# Patient Record
Sex: MALE | Race: WHITE | Employment: FULL TIME | URBAN - METROPOLITAN AREA
[De-identification: names, ages, dates, MRNs, and addresses within clinical notes are randomized per-mention and may not be internally consistent; named-entity substitution may affect disease eponyms.]

---

## 2019-02-02 ENCOUNTER — APPOINTMENT (OUTPATIENT)
Dept: GENERAL RADIOLOGY | Age: 54
DRG: 247 | End: 2019-02-02
Payer: COMMERCIAL

## 2019-02-02 ENCOUNTER — HOSPITAL ENCOUNTER (INPATIENT)
Age: 54
LOS: 2 days | Discharge: HOME | DRG: 247 | End: 2019-02-04
Attending: EMERGENCY MEDICINE | Admitting: EMERGENCY MEDICINE
Payer: COMMERCIAL

## 2019-02-02 DIAGNOSIS — I49.8 BIGEMINY: ICD-10-CM

## 2019-02-02 DIAGNOSIS — R07.9 CHEST PAIN, UNSPECIFIED TYPE: Primary | ICD-10-CM

## 2019-02-02 DIAGNOSIS — R77.8 ELEVATED TROPONIN: ICD-10-CM

## 2019-02-02 PROBLEM — I21.4 NSTEMI (NON-ST ELEVATED MYOCARDIAL INFARCTION) (HCC): Status: ACTIVE | Noted: 2019-02-02

## 2019-02-02 LAB
ALBUMIN SERPL-MCNC: 4.3 GM/DL (ref 3.4–5)
ALP BLD-CCNC: 75 IU/L (ref 40–129)
ALT SERPL-CCNC: 15 U/L (ref 10–40)
ANION GAP SERPL CALCULATED.3IONS-SCNC: 12 MMOL/L (ref 4–16)
AST SERPL-CCNC: 16 IU/L (ref 15–37)
BASOPHILS ABSOLUTE: 0.1 K/CU MM
BASOPHILS RELATIVE PERCENT: 0.6 % (ref 0–1)
BILIRUB SERPL-MCNC: 0.2 MG/DL (ref 0–1)
BUN BLDV-MCNC: 16 MG/DL (ref 6–23)
CALCIUM SERPL-MCNC: 9.5 MG/DL (ref 8.3–10.6)
CHLORIDE BLD-SCNC: 103 MMOL/L (ref 99–110)
CO2: 27 MMOL/L (ref 21–32)
CREAT SERPL-MCNC: 1.1 MG/DL (ref 0.9–1.3)
DIFFERENTIAL TYPE: ABNORMAL
EOSINOPHILS ABSOLUTE: 0.5 K/CU MM
EOSINOPHILS RELATIVE PERCENT: 5 % (ref 0–3)
GFR AFRICAN AMERICAN: >60 ML/MIN/1.73M2
GFR NON-AFRICAN AMERICAN: >60 ML/MIN/1.73M2
GLUCOSE BLD-MCNC: 137 MG/DL (ref 70–99)
HCT VFR BLD CALC: 43.1 % (ref 42–52)
HEMOGLOBIN: 14.2 GM/DL (ref 13.5–18)
IMMATURE NEUTROPHIL %: 0.2 % (ref 0–0.43)
LYMPHOCYTES ABSOLUTE: 4.2 K/CU MM
LYMPHOCYTES RELATIVE PERCENT: 41.3 % (ref 24–44)
MCH RBC QN AUTO: 30.3 PG (ref 27–31)
MCHC RBC AUTO-ENTMCNC: 32.9 % (ref 32–36)
MCV RBC AUTO: 91.9 FL (ref 78–100)
MONOCYTES ABSOLUTE: 0.7 K/CU MM
MONOCYTES RELATIVE PERCENT: 7 % (ref 0–4)
NUCLEATED RBC %: 0 %
PDW BLD-RTO: 13.3 % (ref 11.7–14.9)
PLATELET # BLD: 304 K/CU MM (ref 140–440)
PMV BLD AUTO: 9.3 FL (ref 7.5–11.1)
POTASSIUM SERPL-SCNC: 4.6 MMOL/L (ref 3.5–5.1)
PRO-BNP: 230.1 PG/ML
RBC # BLD: 4.69 M/CU MM (ref 4.6–6.2)
SEGMENTED NEUTROPHILS ABSOLUTE COUNT: 4.7 K/CU MM
SEGMENTED NEUTROPHILS RELATIVE PERCENT: 45.9 % (ref 36–66)
SODIUM BLD-SCNC: 142 MMOL/L (ref 135–145)
TOTAL IMMATURE NEUTOROPHIL: 0.02 K/CU MM
TOTAL NUCLEATED RBC: 0 K/CU MM
TOTAL PROTEIN: 7.3 GM/DL (ref 6.4–8.2)
TROPONIN T: 0.04 NG/ML
TROPONIN T: 0.06 NG/ML
WBC # BLD: 10.2 K/CU MM (ref 4–10.5)

## 2019-02-02 PROCEDURE — 36415 COLL VENOUS BLD VENIPUNCTURE: CPT

## 2019-02-02 PROCEDURE — G0378 HOSPITAL OBSERVATION PER HR: HCPCS

## 2019-02-02 PROCEDURE — 93010 ELECTROCARDIOGRAM REPORT: CPT | Performed by: INTERNAL MEDICINE

## 2019-02-02 PROCEDURE — 96372 THER/PROPH/DIAG INJ SC/IM: CPT

## 2019-02-02 PROCEDURE — 99285 EMERGENCY DEPT VISIT HI MDM: CPT

## 2019-02-02 PROCEDURE — 84484 ASSAY OF TROPONIN QUANT: CPT

## 2019-02-02 PROCEDURE — 6370000000 HC RX 637 (ALT 250 FOR IP): Performed by: EMERGENCY MEDICINE

## 2019-02-02 PROCEDURE — 83880 ASSAY OF NATRIURETIC PEPTIDE: CPT

## 2019-02-02 PROCEDURE — 93005 ELECTROCARDIOGRAM TRACING: CPT | Performed by: EMERGENCY MEDICINE

## 2019-02-02 PROCEDURE — 1200000000 HC SEMI PRIVATE

## 2019-02-02 PROCEDURE — 85025 COMPLETE CBC W/AUTO DIFF WBC: CPT

## 2019-02-02 PROCEDURE — 6360000002 HC RX W HCPCS: Performed by: EMERGENCY MEDICINE

## 2019-02-02 PROCEDURE — 6370000000 HC RX 637 (ALT 250 FOR IP): Performed by: INTERNAL MEDICINE

## 2019-02-02 PROCEDURE — 80053 COMPREHEN METABOLIC PANEL: CPT

## 2019-02-02 PROCEDURE — 2580000003 HC RX 258: Performed by: INTERNAL MEDICINE

## 2019-02-02 PROCEDURE — 71045 X-RAY EXAM CHEST 1 VIEW: CPT

## 2019-02-02 RX ORDER — ASPIRIN 81 MG/1
324 TABLET, CHEWABLE ORAL ONCE
Status: COMPLETED | OUTPATIENT
Start: 2019-02-02 | End: 2019-02-02

## 2019-02-02 RX ORDER — LORATADINE 10 MG/1
5 TABLET ORAL EVERY MORNING
COMMUNITY

## 2019-02-02 RX ORDER — ASPIRIN 81 MG/1
81 TABLET ORAL DAILY
Status: DISCONTINUED | OUTPATIENT
Start: 2019-02-03 | End: 2019-02-03 | Stop reason: ALTCHOICE

## 2019-02-02 RX ORDER — CETIRIZINE HYDROCHLORIDE 5 MG/1
5 TABLET ORAL DAILY
Status: DISCONTINUED | OUTPATIENT
Start: 2019-02-02 | End: 2019-02-04 | Stop reason: HOSPADM

## 2019-02-02 RX ORDER — NITROGLYCERIN 0.4 MG/1
0.4 TABLET SUBLINGUAL EVERY 5 MIN PRN
Status: DISCONTINUED | OUTPATIENT
Start: 2019-02-02 | End: 2019-02-04 | Stop reason: HOSPADM

## 2019-02-02 RX ORDER — SODIUM CHLORIDE 9 MG/ML
INJECTION, SOLUTION INTRAVENOUS CONTINUOUS
Status: DISCONTINUED | OUTPATIENT
Start: 2019-02-02 | End: 2019-02-03 | Stop reason: ALTCHOICE

## 2019-02-02 RX ADMIN — CETIRIZINE HYDROCHLORIDE 5 MG: 5 TABLET, FILM COATED ORAL at 22:00

## 2019-02-02 RX ADMIN — NITROGLYCERIN 0.4 MG: 0.4 TABLET, ORALLY DISINTEGRATING SUBLINGUAL at 16:15

## 2019-02-02 RX ADMIN — ENOXAPARIN SODIUM 105 MG: 120 INJECTION SUBCUTANEOUS at 16:58

## 2019-02-02 RX ADMIN — SODIUM CHLORIDE: 9 INJECTION, SOLUTION INTRAVENOUS at 21:59

## 2019-02-02 RX ADMIN — ASPIRIN 81 MG 324 MG: 81 TABLET ORAL at 16:14

## 2019-02-02 ASSESSMENT — HEART SCORE: ECG: 0

## 2019-02-02 ASSESSMENT — PAIN SCALES - GENERAL: PAINLEVEL_OUTOF10: 0

## 2019-02-03 LAB
ACTIVATED CLOTTING TIME, LOW RANGE: 173 SEC
ACTIVATED CLOTTING TIME, LOW RANGE: >400 SEC
ANION GAP SERPL CALCULATED.3IONS-SCNC: 11 MMOL/L (ref 4–16)
BASOPHILS ABSOLUTE: 0.1 K/CU MM
BASOPHILS RELATIVE PERCENT: 0.6 % (ref 0–1)
BUN BLDV-MCNC: 17 MG/DL (ref 6–23)
CALCIUM SERPL-MCNC: 8.9 MG/DL (ref 8.3–10.6)
CHLORIDE BLD-SCNC: 103 MMOL/L (ref 99–110)
CHOLESTEROL: 247 MG/DL
CO2: 27 MMOL/L (ref 21–32)
CREAT SERPL-MCNC: 1.3 MG/DL (ref 0.9–1.3)
DIFFERENTIAL TYPE: ABNORMAL
EKG ATRIAL RATE: 77 BPM
EKG ATRIAL RATE: 87 BPM
EKG ATRIAL RATE: 88 BPM
EKG DIAGNOSIS: NORMAL
EKG P AXIS: 19 DEGREES
EKG P AXIS: 40 DEGREES
EKG P AXIS: 78 DEGREES
EKG P-R INTERVAL: 148 MS
EKG P-R INTERVAL: 156 MS
EKG P-R INTERVAL: 166 MS
EKG Q-T INTERVAL: 394 MS
EKG Q-T INTERVAL: 396 MS
EKG Q-T INTERVAL: 408 MS
EKG QRS DURATION: 100 MS
EKG QRS DURATION: 86 MS
EKG QRS DURATION: 92 MS
EKG QTC CALCULATION (BAZETT): 445 MS
EKG QTC CALCULATION (BAZETT): 476 MS
EKG QTC CALCULATION (BAZETT): 493 MS
EKG R AXIS: -13 DEGREES
EKG R AXIS: 200 DEGREES
EKG R AXIS: 4 DEGREES
EKG T AXIS: 117 DEGREES
EKG T AXIS: 17 DEGREES
EKG T AXIS: 75 DEGREES
EKG VENTRICULAR RATE: 77 BPM
EKG VENTRICULAR RATE: 87 BPM
EKG VENTRICULAR RATE: 88 BPM
EOSINOPHILS ABSOLUTE: 0.6 K/CU MM
EOSINOPHILS RELATIVE PERCENT: 5.5 % (ref 0–3)
GFR AFRICAN AMERICAN: >60 ML/MIN/1.73M2
GFR NON-AFRICAN AMERICAN: 58 ML/MIN/1.73M2
GLUCOSE BLD-MCNC: 103 MG/DL (ref 70–99)
HCT VFR BLD CALC: 41.6 % (ref 42–52)
HDLC SERPL-MCNC: 34 MG/DL
HEMOGLOBIN: 13.6 GM/DL (ref 13.5–18)
IMMATURE NEUTROPHIL %: 0.2 % (ref 0–0.43)
LDL CHOLESTEROL DIRECT: 171 MG/DL
LYMPHOCYTES ABSOLUTE: 4.8 K/CU MM
LYMPHOCYTES RELATIVE PERCENT: 42.7 % (ref 24–44)
MAGNESIUM: 2.1 MG/DL (ref 1.8–2.4)
MCH RBC QN AUTO: 30.2 PG (ref 27–31)
MCHC RBC AUTO-ENTMCNC: 32.7 % (ref 32–36)
MCV RBC AUTO: 92.4 FL (ref 78–100)
MONOCYTES ABSOLUTE: 0.8 K/CU MM
MONOCYTES RELATIVE PERCENT: 6.8 % (ref 0–4)
NUCLEATED RBC %: 0 %
PDW BLD-RTO: 13.4 % (ref 11.7–14.9)
PLATELET # BLD: 277 K/CU MM (ref 140–440)
PMV BLD AUTO: 9.3 FL (ref 7.5–11.1)
POTASSIUM SERPL-SCNC: 4.2 MMOL/L (ref 3.5–5.1)
RBC # BLD: 4.5 M/CU MM (ref 4.6–6.2)
SEGMENTED NEUTROPHILS ABSOLUTE COUNT: 5 K/CU MM
SEGMENTED NEUTROPHILS RELATIVE PERCENT: 44.2 % (ref 36–66)
SODIUM BLD-SCNC: 141 MMOL/L (ref 135–145)
TOTAL IMMATURE NEUTOROPHIL: 0.02 K/CU MM
TOTAL NUCLEATED RBC: 0 K/CU MM
TRIGL SERPL-MCNC: 392 MG/DL
TROPONIN T: 0.07 NG/ML
TROPONIN T: 0.08 NG/ML
WBC # BLD: 11.3 K/CU MM (ref 4–10.5)

## 2019-02-03 PROCEDURE — 6360000002 HC RX W HCPCS

## 2019-02-03 PROCEDURE — 93458 L HRT ARTERY/VENTRICLE ANGIO: CPT

## 2019-02-03 PROCEDURE — B2151ZZ FLUOROSCOPY OF LEFT HEART USING LOW OSMOLAR CONTRAST: ICD-10-PCS | Performed by: INTERNAL MEDICINE

## 2019-02-03 PROCEDURE — 6360000004 HC RX CONTRAST MEDICATION

## 2019-02-03 PROCEDURE — 6370000000 HC RX 637 (ALT 250 FOR IP): Performed by: INTERNAL MEDICINE

## 2019-02-03 PROCEDURE — 2580000003 HC RX 258

## 2019-02-03 PROCEDURE — 92929 PR PRQ TRLUML CORONARY STENT W/ANGIO ADDL ART/BRNCH: CPT | Performed by: INTERNAL MEDICINE

## 2019-02-03 PROCEDURE — 92928 PRQ TCAT PLMT NTRAC ST 1 LES: CPT | Performed by: INTERNAL MEDICINE

## 2019-02-03 PROCEDURE — 93458 L HRT ARTERY/VENTRICLE ANGIO: CPT | Performed by: INTERNAL MEDICINE

## 2019-02-03 PROCEDURE — 92928 PRQ TCAT PLMT NTRAC ST 1 LES: CPT

## 2019-02-03 PROCEDURE — 83735 ASSAY OF MAGNESIUM: CPT

## 2019-02-03 PROCEDURE — 6360000002 HC RX W HCPCS: Performed by: INTERNAL MEDICINE

## 2019-02-03 PROCEDURE — 36415 COLL VENOUS BLD VENIPUNCTURE: CPT

## 2019-02-03 PROCEDURE — 1200000000 HC SEMI PRIVATE

## 2019-02-03 PROCEDURE — 99253 IP/OBS CNSLTJ NEW/EST LOW 45: CPT | Performed by: INTERNAL MEDICINE

## 2019-02-03 PROCEDURE — 80048 BASIC METABOLIC PNL TOTAL CA: CPT

## 2019-02-03 PROCEDURE — 2500000003 HC RX 250 WO HCPCS

## 2019-02-03 PROCEDURE — 83721 ASSAY OF BLOOD LIPOPROTEIN: CPT

## 2019-02-03 PROCEDURE — 85347 COAGULATION TIME ACTIVATED: CPT

## 2019-02-03 PROCEDURE — B2101ZZ FLUOROSCOPY OF SINGLE CORONARY ARTERY USING LOW OSMOLAR CONTRAST: ICD-10-PCS | Performed by: INTERNAL MEDICINE

## 2019-02-03 PROCEDURE — 027035Z DILATION OF CORONARY ARTERY, ONE ARTERY WITH TWO DRUG-ELUTING INTRALUMINAL DEVICES, PERCUTANEOUS APPROACH: ICD-10-PCS | Performed by: INTERNAL MEDICINE

## 2019-02-03 PROCEDURE — C1769 GUIDE WIRE: HCPCS

## 2019-02-03 PROCEDURE — 85025 COMPLETE CBC W/AUTO DIFF WBC: CPT

## 2019-02-03 PROCEDURE — 80061 LIPID PANEL: CPT

## 2019-02-03 PROCEDURE — 4A023N7 MEASUREMENT OF CARDIAC SAMPLING AND PRESSURE, LEFT HEART, PERCUTANEOUS APPROACH: ICD-10-PCS | Performed by: INTERNAL MEDICINE

## 2019-02-03 PROCEDURE — 84484 ASSAY OF TROPONIN QUANT: CPT

## 2019-02-03 PROCEDURE — C1874 STENT, COATED/COV W/DEL SYS: HCPCS

## 2019-02-03 PROCEDURE — 2709999900 HC NON-CHARGEABLE SUPPLY

## 2019-02-03 PROCEDURE — C1887 CATHETER, GUIDING: HCPCS

## 2019-02-03 PROCEDURE — 93005 ELECTROCARDIOGRAM TRACING: CPT | Performed by: INTERNAL MEDICINE

## 2019-02-03 PROCEDURE — C1725 CATH, TRANSLUMIN NON-LASER: HCPCS

## 2019-02-03 PROCEDURE — 2580000003 HC RX 258: Performed by: INTERNAL MEDICINE

## 2019-02-03 PROCEDURE — C1894 INTRO/SHEATH, NON-LASER: HCPCS

## 2019-02-03 PROCEDURE — 6370000000 HC RX 637 (ALT 250 FOR IP)

## 2019-02-03 RX ORDER — ATORVASTATIN CALCIUM 40 MG/1
40 TABLET, FILM COATED ORAL NIGHTLY
Status: DISCONTINUED | OUTPATIENT
Start: 2019-02-03 | End: 2019-02-04 | Stop reason: HOSPADM

## 2019-02-03 RX ORDER — SODIUM CHLORIDE 0.9 % (FLUSH) 0.9 %
10 SYRINGE (ML) INJECTION EVERY 12 HOURS SCHEDULED
Status: DISCONTINUED | OUTPATIENT
Start: 2019-02-03 | End: 2019-02-04 | Stop reason: HOSPADM

## 2019-02-03 RX ORDER — ASPIRIN 81 MG/1
81 TABLET, CHEWABLE ORAL DAILY
Status: DISCONTINUED | OUTPATIENT
Start: 2019-02-04 | End: 2019-02-04 | Stop reason: HOSPADM

## 2019-02-03 RX ORDER — ACETAMINOPHEN 325 MG/1
650 TABLET ORAL EVERY 4 HOURS PRN
Status: DISCONTINUED | OUTPATIENT
Start: 2019-02-03 | End: 2019-02-04 | Stop reason: HOSPADM

## 2019-02-03 RX ORDER — ONDANSETRON 2 MG/ML
4 INJECTION INTRAMUSCULAR; INTRAVENOUS EVERY 6 HOURS PRN
Status: DISCONTINUED | OUTPATIENT
Start: 2019-02-03 | End: 2019-02-04 | Stop reason: HOSPADM

## 2019-02-03 RX ORDER — SODIUM CHLORIDE 0.9 % (FLUSH) 0.9 %
10 SYRINGE (ML) INJECTION PRN
Status: DISCONTINUED | OUTPATIENT
Start: 2019-02-03 | End: 2019-02-04 | Stop reason: HOSPADM

## 2019-02-03 RX ORDER — SODIUM CHLORIDE 9 MG/ML
INJECTION, SOLUTION INTRAVENOUS CONTINUOUS
Status: DISCONTINUED | OUTPATIENT
Start: 2019-02-03 | End: 2019-02-04 | Stop reason: HOSPADM

## 2019-02-03 RX ORDER — PRASUGREL 10 MG/1
10 TABLET, FILM COATED ORAL DAILY
Status: DISCONTINUED | OUTPATIENT
Start: 2019-02-04 | End: 2019-02-04 | Stop reason: HOSPADM

## 2019-02-03 RX ADMIN — SODIUM CHLORIDE: 9 INJECTION, SOLUTION INTRAVENOUS at 23:44

## 2019-02-03 RX ADMIN — METOPROLOL TARTRATE 25 MG: 25 TABLET ORAL at 12:02

## 2019-02-03 RX ADMIN — ENOXAPARIN SODIUM 105 MG: 120 INJECTION SUBCUTANEOUS at 05:52

## 2019-02-03 RX ADMIN — METOPROLOL TARTRATE 25 MG: 25 TABLET ORAL at 20:07

## 2019-02-03 RX ADMIN — ENOXAPARIN SODIUM 105 MG: 120 INJECTION SUBCUTANEOUS at 18:46

## 2019-02-03 RX ADMIN — CETIRIZINE HYDROCHLORIDE 5 MG: 5 TABLET, FILM COATED ORAL at 12:02

## 2019-02-03 RX ADMIN — ATORVASTATIN CALCIUM 40 MG: 40 TABLET, FILM COATED ORAL at 20:07

## 2019-02-03 RX ADMIN — SODIUM CHLORIDE: 9 INJECTION, SOLUTION INTRAVENOUS at 11:55

## 2019-02-03 RX ADMIN — SODIUM CHLORIDE, PRESERVATIVE FREE 10 ML: 5 INJECTION INTRAVENOUS at 20:07

## 2019-02-03 ASSESSMENT — PAIN SCALES - GENERAL: PAINLEVEL_OUTOF10: 0

## 2019-02-04 VITALS
WEIGHT: 247 LBS | BODY MASS INDEX: 31.7 KG/M2 | DIASTOLIC BLOOD PRESSURE: 73 MMHG | HEART RATE: 80 BPM | HEIGHT: 74 IN | OXYGEN SATURATION: 96 % | TEMPERATURE: 98.5 F | RESPIRATION RATE: 11 BRPM | SYSTOLIC BLOOD PRESSURE: 133 MMHG

## 2019-02-04 LAB
ANION GAP SERPL CALCULATED.3IONS-SCNC: 11 MMOL/L (ref 4–16)
BASOPHILS ABSOLUTE: 0.1 K/CU MM
BASOPHILS RELATIVE PERCENT: 0.7 % (ref 0–1)
BUN BLDV-MCNC: 13 MG/DL (ref 6–23)
CALCIUM SERPL-MCNC: 8.6 MG/DL (ref 8.3–10.6)
CHLORIDE BLD-SCNC: 105 MMOL/L (ref 99–110)
CO2: 22 MMOL/L (ref 21–32)
CREAT SERPL-MCNC: 0.9 MG/DL (ref 0.9–1.3)
DIFFERENTIAL TYPE: ABNORMAL
EKG ATRIAL RATE: 72 BPM
EKG DIAGNOSIS: NORMAL
EKG P AXIS: 27 DEGREES
EKG P-R INTERVAL: 166 MS
EKG Q-T INTERVAL: 426 MS
EKG QRS DURATION: 94 MS
EKG QTC CALCULATION (BAZETT): 466 MS
EKG R AXIS: -1 DEGREES
EKG T AXIS: 11 DEGREES
EKG VENTRICULAR RATE: 72 BPM
EOSINOPHILS ABSOLUTE: 0.6 K/CU MM
EOSINOPHILS RELATIVE PERCENT: 5.8 % (ref 0–3)
GFR AFRICAN AMERICAN: >60 ML/MIN/1.73M2
GFR NON-AFRICAN AMERICAN: >60 ML/MIN/1.73M2
GLUCOSE BLD-MCNC: 107 MG/DL (ref 70–99)
HCT VFR BLD CALC: 39.6 % (ref 42–52)
HEMOGLOBIN: 13.2 GM/DL (ref 13.5–18)
IMMATURE NEUTROPHIL %: 0.3 % (ref 0–0.43)
LV EF: 58 %
LVEF MODALITY: NORMAL
LYMPHOCYTES ABSOLUTE: 4.4 K/CU MM
LYMPHOCYTES RELATIVE PERCENT: 41.8 % (ref 24–44)
MAGNESIUM: 2 MG/DL (ref 1.8–2.4)
MCH RBC QN AUTO: 30.5 PG (ref 27–31)
MCHC RBC AUTO-ENTMCNC: 33.3 % (ref 32–36)
MCV RBC AUTO: 91.5 FL (ref 78–100)
MONOCYTES ABSOLUTE: 0.9 K/CU MM
MONOCYTES RELATIVE PERCENT: 8.5 % (ref 0–4)
NUCLEATED RBC %: 0 %
PDW BLD-RTO: 13.3 % (ref 11.7–14.9)
PLATELET # BLD: 246 K/CU MM (ref 140–440)
PMV BLD AUTO: 9.3 FL (ref 7.5–11.1)
POTASSIUM SERPL-SCNC: 4.1 MMOL/L (ref 3.5–5.1)
RBC # BLD: 4.33 M/CU MM (ref 4.6–6.2)
SEGMENTED NEUTROPHILS ABSOLUTE COUNT: 4.5 K/CU MM
SEGMENTED NEUTROPHILS RELATIVE PERCENT: 42.9 % (ref 36–66)
SODIUM BLD-SCNC: 138 MMOL/L (ref 135–145)
TOTAL IMMATURE NEUTOROPHIL: 0.03 K/CU MM
TOTAL NUCLEATED RBC: 0 K/CU MM
WBC # BLD: 10.5 K/CU MM (ref 4–10.5)

## 2019-02-04 PROCEDURE — 90686 IIV4 VACC NO PRSV 0.5 ML IM: CPT | Performed by: INTERNAL MEDICINE

## 2019-02-04 PROCEDURE — 6360000002 HC RX W HCPCS: Performed by: INTERNAL MEDICINE

## 2019-02-04 PROCEDURE — 85025 COMPLETE CBC W/AUTO DIFF WBC: CPT

## 2019-02-04 PROCEDURE — 80048 BASIC METABOLIC PNL TOTAL CA: CPT

## 2019-02-04 PROCEDURE — 93306 TTE W/DOPPLER COMPLETE: CPT

## 2019-02-04 PROCEDURE — 36415 COLL VENOUS BLD VENIPUNCTURE: CPT

## 2019-02-04 PROCEDURE — 94761 N-INVAS EAR/PLS OXIMETRY MLT: CPT

## 2019-02-04 PROCEDURE — 6370000000 HC RX 637 (ALT 250 FOR IP): Performed by: INTERNAL MEDICINE

## 2019-02-04 PROCEDURE — G0008 ADMIN INFLUENZA VIRUS VAC: HCPCS | Performed by: INTERNAL MEDICINE

## 2019-02-04 PROCEDURE — 99233 SBSQ HOSP IP/OBS HIGH 50: CPT | Performed by: INTERNAL MEDICINE

## 2019-02-04 PROCEDURE — 83735 ASSAY OF MAGNESIUM: CPT

## 2019-02-04 RX ORDER — ATORVASTATIN CALCIUM 40 MG/1
40 TABLET, FILM COATED ORAL NIGHTLY
Qty: 90 TABLET | Refills: 3 | Status: SHIPPED | OUTPATIENT
Start: 2019-02-04 | End: 2019-05-22 | Stop reason: SDUPTHER

## 2019-02-04 RX ORDER — METOPROLOL TARTRATE 50 MG/1
50 TABLET, FILM COATED ORAL 2 TIMES DAILY
Qty: 180 TABLET | Refills: 3 | Status: SHIPPED | OUTPATIENT
Start: 2019-02-04 | End: 2019-02-19 | Stop reason: SDUPTHER

## 2019-02-04 RX ORDER — ASPIRIN 81 MG/1
81 TABLET, CHEWABLE ORAL DAILY
Qty: 90 TABLET | Refills: 0 | Status: SHIPPED | OUTPATIENT
Start: 2019-02-05 | End: 2019-02-04

## 2019-02-04 RX ORDER — METOPROLOL TARTRATE 50 MG/1
50 TABLET, FILM COATED ORAL 2 TIMES DAILY
Qty: 180 TABLET | Refills: 0 | Status: SHIPPED | OUTPATIENT
Start: 2019-02-04 | End: 2019-02-04

## 2019-02-04 RX ORDER — METOPROLOL TARTRATE 50 MG/1
50 TABLET, FILM COATED ORAL 2 TIMES DAILY
Status: DISCONTINUED | OUTPATIENT
Start: 2019-02-04 | End: 2019-02-04 | Stop reason: HOSPADM

## 2019-02-04 RX ORDER — PRASUGREL 10 MG/1
10 TABLET, FILM COATED ORAL DAILY
Qty: 90 TABLET | Refills: 0 | Status: SHIPPED | OUTPATIENT
Start: 2019-02-05 | End: 2019-02-04

## 2019-02-04 RX ORDER — NITROGLYCERIN 0.4 MG/1
TABLET SUBLINGUAL
Qty: 30 TABLET | Refills: 0 | Status: SHIPPED | OUTPATIENT
Start: 2019-02-04

## 2019-02-04 RX ORDER — ASPIRIN 81 MG/1
81 TABLET, CHEWABLE ORAL DAILY
Qty: 90 TABLET | Refills: 3 | Status: SHIPPED | OUTPATIENT
Start: 2019-02-05

## 2019-02-04 RX ORDER — NITROGLYCERIN 0.4 MG/1
TABLET SUBLINGUAL
Qty: 30 TABLET | Refills: 0 | Status: SHIPPED | OUTPATIENT
Start: 2019-02-04 | End: 2019-02-04

## 2019-02-04 RX ORDER — PRASUGREL 10 MG/1
10 TABLET, FILM COATED ORAL DAILY
Qty: 90 TABLET | Refills: 3 | Status: SHIPPED | OUTPATIENT
Start: 2019-02-05 | End: 2019-05-22 | Stop reason: SDUPTHER

## 2019-02-04 RX ORDER — ATORVASTATIN CALCIUM 40 MG/1
40 TABLET, FILM COATED ORAL NIGHTLY
Qty: 90 TABLET | Refills: 0 | Status: SHIPPED | OUTPATIENT
Start: 2019-02-04 | End: 2019-02-04

## 2019-02-04 RX ADMIN — ASPIRIN 81 MG 81 MG: 81 TABLET ORAL at 09:31

## 2019-02-04 RX ADMIN — PRASUGREL 10 MG: 10 TABLET, FILM COATED ORAL at 09:32

## 2019-02-04 RX ADMIN — METOPROLOL TARTRATE 50 MG: 50 TABLET ORAL at 09:31

## 2019-02-04 RX ADMIN — ENOXAPARIN SODIUM 105 MG: 120 INJECTION SUBCUTANEOUS at 06:09

## 2019-02-04 RX ADMIN — CETIRIZINE HYDROCHLORIDE 5 MG: 5 TABLET, FILM COATED ORAL at 09:32

## 2019-02-04 RX ADMIN — INFLUENZA A VIRUS A/MICHIGAN/45/2015 X-275 (H1N1) ANTIGEN (FORMALDEHYDE INACTIVATED), INFLUENZA A VIRUS A/SINGAPORE/INFIMH-16-0019/2016 IVR-186 (H3N2) ANTIGEN (FORMALDEHYDE INACTIVATED), INFLUENZA B VIRUS B/PHUKET/3073/2013 ANTIGEN (FORMALDEHYDE INACTIVATED), AND INFLUENZA B VIRUS B/MARYLAND/15/2016 BX-69A ANTIGEN (FORMALDEHYDE INACTIVATED) 0.5 ML: 15; 15; 15; 15 INJECTION, SUSPENSION INTRAMUSCULAR at 09:32

## 2019-02-07 ENCOUNTER — TELEPHONE (OUTPATIENT)
Dept: CARDIOLOGY CLINIC | Age: 54
End: 2019-02-07

## 2019-02-07 NOTE — TELEPHONE ENCOUNTER
Spoke with patient, he had stent placement 2/3 and is reporting pain x 2 days described as pain from chest thru to back. Pain only lasts 3-5 seconds but he is concerned that he had this for several months prior to recent cardiac event.  Will refer to Dr Lili Singleton

## 2019-02-08 ENCOUNTER — TELEPHONE (OUTPATIENT)
Dept: CARDIOLOGY CLINIC | Age: 54
End: 2019-02-08

## 2019-02-08 RX ORDER — OMEPRAZOLE 20 MG/1
20 CAPSULE, DELAYED RELEASE ORAL DAILY
Qty: 90 CAPSULE | Refills: 3 | Status: SHIPPED | OUTPATIENT
Start: 2019-02-08 | End: 2019-02-11 | Stop reason: SDUPTHER

## 2019-02-11 ENCOUNTER — OFFICE VISIT (OUTPATIENT)
Dept: CARDIOLOGY CLINIC | Age: 54
End: 2019-02-11
Payer: COMMERCIAL

## 2019-02-11 VITALS
WEIGHT: 256 LBS | DIASTOLIC BLOOD PRESSURE: 80 MMHG | BODY MASS INDEX: 32.85 KG/M2 | HEIGHT: 74 IN | SYSTOLIC BLOOD PRESSURE: 128 MMHG | HEART RATE: 60 BPM

## 2019-02-11 DIAGNOSIS — I21.4 NSTEMI (NON-ST ELEVATED MYOCARDIAL INFARCTION) (HCC): ICD-10-CM

## 2019-02-11 DIAGNOSIS — Z72.0 TOBACCO ABUSE: ICD-10-CM

## 2019-02-11 DIAGNOSIS — I25.2 H/O NON-ST ELEVATION MYOCARDIAL INFARCTION (NSTEMI): ICD-10-CM

## 2019-02-11 DIAGNOSIS — Z98.61 S/P PTCA (PERCUTANEOUS TRANSLUMINAL CORONARY ANGIOPLASTY): Primary | ICD-10-CM

## 2019-02-11 DIAGNOSIS — I25.10 ASCVD (ARTERIOSCLEROTIC CARDIOVASCULAR DISEASE): ICD-10-CM

## 2019-02-11 PROCEDURE — 99214 OFFICE O/P EST MOD 30 MIN: CPT | Performed by: INTERNAL MEDICINE

## 2019-02-11 PROCEDURE — 93000 ELECTROCARDIOGRAM COMPLETE: CPT | Performed by: INTERNAL MEDICINE

## 2019-02-11 RX ORDER — OMEPRAZOLE 20 MG/1
20 CAPSULE, DELAYED RELEASE ORAL DAILY
Qty: 90 CAPSULE | Refills: 3 | Status: SHIPPED | OUTPATIENT
Start: 2019-02-11 | End: 2020-02-27

## 2019-02-12 ENCOUNTER — TELEPHONE (OUTPATIENT)
Dept: CARDIOLOGY CLINIC | Age: 54
End: 2019-02-12

## 2019-02-19 ENCOUNTER — PROCEDURE VISIT (OUTPATIENT)
Dept: CARDIOLOGY CLINIC | Age: 54
End: 2019-02-19
Payer: COMMERCIAL

## 2019-02-19 VITALS
HEIGHT: 74 IN | HEART RATE: 83 BPM | WEIGHT: 256 LBS | SYSTOLIC BLOOD PRESSURE: 142 MMHG | DIASTOLIC BLOOD PRESSURE: 70 MMHG | BODY MASS INDEX: 32.85 KG/M2

## 2019-02-19 DIAGNOSIS — Z98.61 S/P PTCA (PERCUTANEOUS TRANSLUMINAL CORONARY ANGIOPLASTY): Primary | ICD-10-CM

## 2019-02-19 DIAGNOSIS — I25.10 ASCVD (ARTERIOSCLEROTIC CARDIOVASCULAR DISEASE): ICD-10-CM

## 2019-02-19 DIAGNOSIS — I25.2 H/O NON-ST ELEVATION MYOCARDIAL INFARCTION (NSTEMI): ICD-10-CM

## 2019-02-19 DIAGNOSIS — I49.3 PVC (PREMATURE VENTRICULAR CONTRACTION): Primary | ICD-10-CM

## 2019-02-19 PROCEDURE — 93015 CV STRESS TEST SUPVJ I&R: CPT | Performed by: INTERNAL MEDICINE

## 2019-02-19 RX ORDER — METOPROLOL TARTRATE 50 MG/1
100 TABLET, FILM COATED ORAL 2 TIMES DAILY
Qty: 180 TABLET | Refills: 3 | Status: SHIPPED | OUTPATIENT
Start: 2019-02-19 | End: 2019-05-22

## 2019-02-20 ENCOUNTER — TELEPHONE (OUTPATIENT)
Dept: CARDIOLOGY CLINIC | Age: 54
End: 2019-02-20

## 2019-03-07 ENCOUNTER — NURSE ONLY (OUTPATIENT)
Dept: CARDIOLOGY CLINIC | Age: 54
End: 2019-03-07
Payer: COMMERCIAL

## 2019-03-07 DIAGNOSIS — R00.2 PALPITATIONS: Primary | ICD-10-CM

## 2019-03-07 DIAGNOSIS — I49.3 PVC (PREMATURE VENTRICULAR CONTRACTION): ICD-10-CM

## 2019-03-07 PROCEDURE — 93227 XTRNL ECG REC<48 HR R&I: CPT | Performed by: INTERNAL MEDICINE

## 2019-03-07 PROCEDURE — 93225 XTRNL ECG REC<48 HRS REC: CPT | Performed by: INTERNAL MEDICINE

## 2019-03-08 ENCOUNTER — TELEPHONE (OUTPATIENT)
Dept: CARDIOLOGY CLINIC | Age: 54
End: 2019-03-08

## 2019-03-14 ENCOUNTER — TELEPHONE (OUTPATIENT)
Dept: CARDIOLOGY CLINIC | Age: 54
End: 2019-03-14

## 2019-03-15 ENCOUNTER — TELEPHONE (OUTPATIENT)
Dept: CARDIOLOGY CLINIC | Age: 54
End: 2019-03-15

## 2019-03-19 ENCOUNTER — TELEPHONE (OUTPATIENT)
Dept: CARDIOLOGY CLINIC | Age: 54
End: 2019-03-19

## 2019-05-22 ENCOUNTER — OFFICE VISIT (OUTPATIENT)
Dept: CARDIOLOGY CLINIC | Age: 54
End: 2019-05-22
Payer: COMMERCIAL

## 2019-05-22 VITALS
SYSTOLIC BLOOD PRESSURE: 124 MMHG | WEIGHT: 259.6 LBS | HEIGHT: 74 IN | BODY MASS INDEX: 33.32 KG/M2 | DIASTOLIC BLOOD PRESSURE: 76 MMHG | HEART RATE: 74 BPM

## 2019-05-22 DIAGNOSIS — I25.10 ASCVD (ARTERIOSCLEROTIC CARDIOVASCULAR DISEASE): Primary | ICD-10-CM

## 2019-05-22 DIAGNOSIS — I21.4 NSTEMI (NON-ST ELEVATED MYOCARDIAL INFARCTION) (HCC): ICD-10-CM

## 2019-05-22 DIAGNOSIS — I25.2 H/O NON-ST ELEVATION MYOCARDIAL INFARCTION (NSTEMI): ICD-10-CM

## 2019-05-22 DIAGNOSIS — Z72.0 TOBACCO ABUSE: ICD-10-CM

## 2019-05-22 PROCEDURE — 99214 OFFICE O/P EST MOD 30 MIN: CPT | Performed by: INTERNAL MEDICINE

## 2019-05-22 RX ORDER — PRASUGREL 10 MG/1
10 TABLET, FILM COATED ORAL DAILY
Qty: 90 TABLET | Refills: 3 | Status: SHIPPED | OUTPATIENT
Start: 2019-05-22 | End: 2020-05-05 | Stop reason: ALTCHOICE

## 2019-05-22 RX ORDER — ATORVASTATIN CALCIUM 40 MG/1
40 TABLET, FILM COATED ORAL NIGHTLY
Qty: 90 TABLET | Refills: 3 | Status: SHIPPED | OUTPATIENT
Start: 2019-05-22 | End: 2020-06-08 | Stop reason: SDUPTHER

## 2019-05-22 RX ORDER — NICOTINE 21 MG/24HR
1 PATCH, TRANSDERMAL 24 HOURS TRANSDERMAL DAILY
Qty: 42 PATCH | Refills: 0 | Status: SHIPPED | OUTPATIENT
Start: 2019-05-22 | End: 2019-10-29

## 2019-05-22 RX ORDER — METOPROLOL TARTRATE 100 MG/1
100 TABLET ORAL 2 TIMES DAILY
Qty: 60 TABLET | Refills: 2 | Status: SHIPPED | OUTPATIENT
Start: 2019-05-22 | End: 2019-08-10 | Stop reason: SDUPTHER

## 2019-05-22 NOTE — PROGRESS NOTES
CARDIOLOGY  NOTE    Chief Complaint: ASCVD    HPI:   Maricarmen Griffith is a 48y.o. year old who has history as noted below. History of NSTEMI pci to RCA in 2019  . He is back at work. HE did not go to cardiac rehab due to cost . He is smoking again . He is going to need right shoulder surgery due to arthritis . HE wants to take testosterone , He says he has less energy    He has no chest pain. He's feeling much better. He had mild chest discomfort which improved after starting Prilosec      Current Outpatient Medications   Medication Sig Dispense Refill    metoprolol tartrate (LOPRESSOR) 100 MG tablet Take 1 tablet by mouth 2 times daily 60 tablet 2    prasugrel (EFFIENT) 10 MG TABS Take 1 tablet by mouth daily 90 tablet 3    atorvastatin (LIPITOR) 40 MG tablet Take 1 tablet by mouth nightly 90 tablet 3    omeprazole (PRILOSEC) 20 MG delayed release capsule Take 1 capsule by mouth daily 90 capsule 3    aspirin 81 MG chewable tablet Take 1 tablet by mouth daily Enteric formulation 90 tablet 3    nitroGLYCERIN (NITROSTAT) 0.4 MG SL tablet up to max of 3 total doses. If no relief after 1 dose, call 911. 30 tablet 0    loratadine (CLARITIN) 10 MG tablet Take 5 mg by mouth daily       No current facility-administered medications for this visit. Allergies:   Patient has no known allergies. Patient History:  Past Medical History:   Diagnosis Date    NSTEMI (non-ST elevated myocardial infarction) ([facility] [address] 2019    S/P PTCA (percutaneous transluminal coronary angioplasty) 02/02/2019    X 3 stent RCA mid and Distal, PDA     Past Surgical History:   Procedure Laterality Date    CYST REMOVAL      HERNIA REPAIR       History reviewed. No pertinent family history.   Social History     Tobacco Use    Smoking status: Current Some Day Smoker     Packs/day: 0.50     Last attempt to quit: 2019     Years since quittin.2    Smokeless tobacco: Never Used    Tobacco Soft, No tenderness, No masses, No gross visceromegaly   :  No costovertebral angle tenderness   Musculoskeletal:  No edema, no tenderness, no deformities. Back- no tenderness  Integument:  Well hydrated, no rash   Lymphatic:  No lymphadenopathy noted   Neurologic:  Alert & oriented x 3, CN 2-12 normal, normal motor function, normal sensory function, no focal deficits noted   Psychiatric:  Speech and behavior appropriate       Medical decision making and Data review:  DATA:  Lab Results   Component Value Date    TROPONINT 0.078 (H) 02/03/2019     BNP:    Lab Results   Component Value Date    PROBNP 230.1 02/02/2019     PT/INR:  No results found for: PTINR  No results found for: LABA1C  Lab Results   Component Value Date    CHOL 247 (H) 02/03/2019    TRIG 392 (H) 02/03/2019    HDL 34 (L) 02/03/2019    LDLDIRECT 171 (H) 02/03/2019     Lab Results   Component Value Date    ALT 15 02/02/2019    AST 16 02/02/2019     TSH: No results found for: TSH  Lab Results   Component Value Date    AST 16 02/02/2019    ALT 15 02/02/2019    BILITOT 0.2 02/02/2019    ALKPHOS 75 02/02/2019     Lab Results   Component Value Date    PROBNP 230.1 02/02/2019     No results found for: LABA1C  Lab Results   Component Value Date    WBC 10.5 02/04/2019    HGB 13.2 (L) 02/04/2019    HCT 39.6 (L) 02/04/2019     02/04/2019     Cath 2/2/19   Procedure Summary   Indication: NSTEMI   Access: radial   1. 90 % stenosis of mid RCA and distal RCA has 90 % lesion ,   ostial PDA had 90 % lesion   2. s/p PCI to MID and Distal RCA , PCi to Ostial PDA using   overlapping stent extending from distal RCA to ostial PDA using   2.5 X 20 SETVE   3. 2.75 X 23 and 3.0 X 23 overlapping stents to proximal and mid   RCA reduced 90% lesion to 0 % lesion   4. LAD has minor irregularity   5. Circ is patent   6.  LVEDP was 13 mmHG    Echo 2/4/19  Summary   Left ventricular systolic function is normal.   Ejection fraction is visually estimated at 55-60%.   Left ventricle size is normal.   No regional wall motion abnormalities were detected.   Mildly dilated left atrium.   No evidence of any pericardial effusion.   Frequent Pvc noted     Stress test 2/19/19  Conclusions      Summary   Appropriate hemodynamic response to exercise. No significant ST T wave   changes with exercise. EKG portion is negative for ischemia by diagnostic   criteria. But Significant PVC burden which reduce with exercise      The patient exercised according to the [facility] for 8:59 min:s, achieving a   work level of Max. METS: 10.10. The resting heart rate of 83 bpm akil to a   maximal heart rate of 134 bpm. This value represents 80 % of the maximal,   age-predicted heart rate. The resting blood pressure of 142/70 mmHg , akil   to a maximum blood pressure of 168/88 mmHg.         All labs, medications and tests reviewed by myself including data and history from outside source , patient and available family . Assessment & Plan:      1. ASCVD (arteriosclerotic cardiovascular disease)    2. H/O non-ST elevation myocardial infarction (NSTEMI)    3. NSTEMI (non-ST elevated myocardial infarction) ([address]    4. Tobacco abuse         ASCVD (arteriosclerotic cardiovascular disease)  S/p PCI to RCA for NSTEMI in feb 2019. Continue DAPT for at least one year, continue statins. . He did not go to cardiac rehab, He is going to gym , He says hr is staying ay 100 when he exercises     Fatigue  He really thinks his  Low energy is due to low testosterone level . I told him risk fo increased cardiac events with testosterone level. Tobacco abuse  He is smoking again   He was encouraged not to smoke anymore     Dyslipidemia :  All available lab work was reviewed. Patient was advised to repeat lab work before next visit  He has high triglyceride levels and very high LDL. He has been intolerant of statins in the past.  For now I will continue to use Lipitor 40 mg daily.   He will be a candidate for repatha      Counseled extensively and medication compliance urged. We discussed that for the  prevention of ASCVD our  goal is aggressive risk modification. Patient is encouraged to exercise even a brisk walk for 30 minutes  at least 3 to 4 times a week   Various goals were discussed and questions answered. Continue current medications. Appropriate prescriptions are addressed and refills ordered. Questions answered and patient verbalizes understanding. Call for any problems, questions, or concerns. Continue all other medications of all above medical condition listed as is. Return in about 6 months (around 11/22/2019). Please note this report has been partially produced using speech recognition software and may contain errors related to that system including errors in grammar, punctuation, and spelling, as well as words and phrases that may be inappropriate.  If there are any questions or concerns please feel free to contact the dictating provider for clarification.

## 2019-06-29 ENCOUNTER — HOSPITAL ENCOUNTER (OUTPATIENT)
Age: 54
Discharge: HOME | End: 2019-06-29
Payer: COMMERCIAL

## 2019-06-29 PROCEDURE — 84403 ASSAY OF TOTAL TESTOSTERONE: CPT

## 2019-06-29 PROCEDURE — 36415 COLL VENOUS BLD VENIPUNCTURE: CPT

## 2019-07-02 LAB
TESTOSTERONE TOTAL-MALE: 260 NG/DL (ref 300–890)
TESTOSTERONE TOTAL-MALE: ABNORMAL NG/DL (ref 300–890)

## 2019-07-03 ENCOUNTER — TELEPHONE (OUTPATIENT)
Dept: CARDIOLOGY CLINIC | Age: 54
End: 2019-07-03

## 2019-08-14 RX ORDER — METOPROLOL TARTRATE 100 MG/1
100 TABLET ORAL 2 TIMES DAILY
Qty: 60 TABLET | Refills: 2 | Status: SHIPPED | OUTPATIENT
Start: 2019-08-14 | End: 2019-12-09 | Stop reason: SDUPTHER

## 2019-08-22 RX ORDER — OMEPRAZOLE 20 MG/1
20 CAPSULE, DELAYED RELEASE ORAL DAILY
Qty: 90 CAPSULE | Refills: 3 | Status: CANCELLED | OUTPATIENT
Start: 2019-08-22

## 2019-08-22 RX ORDER — ASPIRIN 81 MG/1
81 TABLET, CHEWABLE ORAL DAILY
Qty: 90 TABLET | Refills: 3 | Status: CANCELLED | OUTPATIENT
Start: 2019-08-22

## 2019-08-22 RX ORDER — ATORVASTATIN CALCIUM 40 MG/1
40 TABLET, FILM COATED ORAL NIGHTLY
Qty: 90 TABLET | Refills: 3 | Status: CANCELLED | OUTPATIENT
Start: 2019-08-22

## 2019-08-22 RX ORDER — PRASUGREL 10 MG/1
10 TABLET, FILM COATED ORAL DAILY
Qty: 90 TABLET | Refills: 3 | Status: CANCELLED | OUTPATIENT
Start: 2019-08-22

## 2019-08-23 RX ORDER — METOPROLOL TARTRATE 100 MG/1
100 TABLET ORAL 2 TIMES DAILY
Qty: 60 TABLET | Refills: 2 | OUTPATIENT
Start: 2019-08-23

## 2019-08-28 ENCOUNTER — TELEPHONE (OUTPATIENT)
Dept: CARDIOLOGY CLINIC | Age: 54
End: 2019-08-28

## 2019-09-05 NOTE — TELEPHONE ENCOUNTER
maximum blood pressure of 168/88 mmHg.      Assessment & Plan:   1. ASCVD (arteriosclerotic cardiovascular disease)    2. H/O non-ST elevation myocardial infarction (NSTEMI)    3. NSTEMI (non-ST elevated myocardial infarction) ([address]    4. Tobacco abuse          ASCVD (arteriosclerotic cardiovascular disease)  S/p PCI to RCA for NSTEMI in feb 2019. Continue DAPT for at least one year, continue statins. . He did not go to cardiac rehab, He is going to gym , He says hr is staying at 100 when he exercises      Fatigue  He really thinks his  Low energy is due to low testosterone level . I told him risk fo increased cardiac events with testosterone level.      Tobacco abuse  He is smoking again   He was encouraged not to smoke anymore      Dyslipidemia :  All available lab work was reviewed. Patient was advised to repeat lab work before next visit  He has high triglyceride levels and very high LDL. He has been intolerant of statins in the past.  For now I will continue to use Lipitor 40 mg daily.   He will be a candidate for repatha

## 2019-10-31 ENCOUNTER — ANESTHESIA EVENT (OUTPATIENT)
Dept: OPERATING ROOM | Age: 54
End: 2019-10-31
Payer: COMMERCIAL

## 2019-10-31 ASSESSMENT — LIFESTYLE VARIABLES: SMOKING_STATUS: 1

## 2019-11-01 ENCOUNTER — HOSPITAL ENCOUNTER (OUTPATIENT)
Age: 54
Setting detail: OUTPATIENT SURGERY
Discharge: HOME | End: 2019-11-01
Attending: SPECIALIST | Admitting: SPECIALIST
Payer: COMMERCIAL

## 2019-11-01 ENCOUNTER — ANESTHESIA (OUTPATIENT)
Dept: OPERATING ROOM | Age: 54
End: 2019-11-01
Payer: COMMERCIAL

## 2019-11-01 VITALS
SYSTOLIC BLOOD PRESSURE: 114 MMHG | OXYGEN SATURATION: 95 % | RESPIRATION RATE: 16 BRPM | DIASTOLIC BLOOD PRESSURE: 45 MMHG

## 2019-11-01 VITALS
BODY MASS INDEX: 33.75 KG/M2 | SYSTOLIC BLOOD PRESSURE: 102 MMHG | TEMPERATURE: 97.3 F | OXYGEN SATURATION: 95 % | DIASTOLIC BLOOD PRESSURE: 62 MMHG | HEIGHT: 74 IN | HEART RATE: 64 BPM | WEIGHT: 263 LBS | RESPIRATION RATE: 16 BRPM

## 2019-11-01 PROCEDURE — 88305 TISSUE EXAM BY PATHOLOGIST: CPT

## 2019-11-01 PROCEDURE — 3700000000 HC ANESTHESIA ATTENDED CARE: Performed by: SPECIALIST

## 2019-11-01 PROCEDURE — 2580000003 HC RX 258: Performed by: SPECIALIST

## 2019-11-01 PROCEDURE — 6370000000 HC RX 637 (ALT 250 FOR IP): Performed by: ANESTHESIOLOGY

## 2019-11-01 PROCEDURE — 6360000002 HC RX W HCPCS: Performed by: ANESTHESIOLOGY

## 2019-11-01 PROCEDURE — 3700000001 HC ADD 15 MINUTES (ANESTHESIA): Performed by: SPECIALIST

## 2019-11-01 PROCEDURE — 3609010600 HC COLONOSCOPY POLYPECTOMY SNARE/COLD BIOPSY: Performed by: SPECIALIST

## 2019-11-01 PROCEDURE — 7100000010 HC PHASE II RECOVERY - FIRST 15 MIN: Performed by: SPECIALIST

## 2019-11-01 PROCEDURE — 2709999900 HC NON-CHARGEABLE SUPPLY: Performed by: SPECIALIST

## 2019-11-01 PROCEDURE — 7100000011 HC PHASE II RECOVERY - ADDTL 15 MIN: Performed by: SPECIALIST

## 2019-11-01 RX ORDER — FENTANYL CITRATE 50 UG/ML
INJECTION, SOLUTION INTRAMUSCULAR; INTRAVENOUS PRN
Status: DISCONTINUED | OUTPATIENT
Start: 2019-11-01 | End: 2019-11-01 | Stop reason: SDUPTHER

## 2019-11-01 RX ORDER — CYANOCOBALAMIN (VITAMIN B-12) 500 MCG
LOZENGE ORAL DAILY
COMMUNITY

## 2019-11-01 RX ORDER — IPRATROPIUM BROMIDE AND ALBUTEROL SULFATE 2.5; .5 MG/3ML; MG/3ML
1 SOLUTION RESPIRATORY (INHALATION)
Status: DISCONTINUED | OUTPATIENT
Start: 2019-11-01 | End: 2019-11-01 | Stop reason: HOSPADM

## 2019-11-01 RX ORDER — MIDAZOLAM HYDROCHLORIDE 1 MG/ML
INJECTION INTRAMUSCULAR; INTRAVENOUS PRN
Status: DISCONTINUED | OUTPATIENT
Start: 2019-11-01 | End: 2019-11-01 | Stop reason: SDUPTHER

## 2019-11-01 RX ORDER — SODIUM CHLORIDE, SODIUM LACTATE, POTASSIUM CHLORIDE, CALCIUM CHLORIDE 600; 310; 30; 20 MG/100ML; MG/100ML; MG/100ML; MG/100ML
INJECTION, SOLUTION INTRAVENOUS CONTINUOUS
Status: DISCONTINUED | OUTPATIENT
Start: 2019-11-01 | End: 2019-11-01 | Stop reason: HOSPADM

## 2019-11-01 RX ORDER — PROPOFOL 10 MG/ML
INJECTION, EMULSION INTRAVENOUS PRN
Status: DISCONTINUED | OUTPATIENT
Start: 2019-11-01 | End: 2019-11-01 | Stop reason: SDUPTHER

## 2019-11-01 RX ADMIN — PROPOFOL 20 MG: 10 INJECTION, EMULSION INTRAVENOUS at 12:47

## 2019-11-01 RX ADMIN — MIDAZOLAM 2 MG: 1 INJECTION INTRAMUSCULAR; INTRAVENOUS at 12:11

## 2019-11-01 RX ADMIN — IPRATROPIUM BROMIDE AND ALBUTEROL SULFATE 1 AMPULE: .5; 3 SOLUTION RESPIRATORY (INHALATION) at 13:26

## 2019-11-01 RX ADMIN — PROPOFOL 20 MG: 10 INJECTION, EMULSION INTRAVENOUS at 12:24

## 2019-11-01 RX ADMIN — SODIUM CHLORIDE, POTASSIUM CHLORIDE, SODIUM LACTATE AND CALCIUM CHLORIDE: 600; 310; 30; 20 INJECTION, SOLUTION INTRAVENOUS at 10:35

## 2019-11-01 RX ADMIN — FENTANYL CITRATE 100 MCG: 50 INJECTION INTRAMUSCULAR; INTRAVENOUS at 12:11

## 2019-11-01 RX ADMIN — PROPOFOL 20 MG: 10 INJECTION, EMULSION INTRAVENOUS at 12:38

## 2019-11-01 RX ADMIN — PROPOFOL 100 MG: 10 INJECTION, EMULSION INTRAVENOUS at 12:20

## 2019-11-01 RX ADMIN — PROPOFOL 20 MG: 10 INJECTION, EMULSION INTRAVENOUS at 12:36

## 2019-11-01 RX ADMIN — PROPOFOL 20 MG: 10 INJECTION, EMULSION INTRAVENOUS at 12:42

## 2019-11-01 RX ADMIN — PROPOFOL 20 MG: 10 INJECTION, EMULSION INTRAVENOUS at 12:27

## 2019-11-01 ASSESSMENT — PAIN SCALES - GENERAL
PAINLEVEL_OUTOF10: 0
PAINLEVEL_OUTOF10: 0

## 2019-11-01 ASSESSMENT — PAIN - FUNCTIONAL ASSESSMENT: PAIN_FUNCTIONAL_ASSESSMENT: 0-10

## 2019-11-05 ENCOUNTER — OFFICE VISIT (OUTPATIENT)
Dept: CARDIOLOGY CLINIC | Age: 54
End: 2019-11-05
Payer: COMMERCIAL

## 2019-11-05 VITALS
SYSTOLIC BLOOD PRESSURE: 114 MMHG | WEIGHT: 263 LBS | DIASTOLIC BLOOD PRESSURE: 82 MMHG | BODY MASS INDEX: 33.75 KG/M2 | HEIGHT: 74 IN | HEART RATE: 60 BPM

## 2019-11-05 DIAGNOSIS — I25.10 ASCVD (ARTERIOSCLEROTIC CARDIOVASCULAR DISEASE): ICD-10-CM

## 2019-11-05 DIAGNOSIS — Z72.0 TOBACCO ABUSE: ICD-10-CM

## 2019-11-05 DIAGNOSIS — I25.2 H/O NON-ST ELEVATION MYOCARDIAL INFARCTION (NSTEMI): ICD-10-CM

## 2019-11-05 DIAGNOSIS — R07.9 CHEST PAIN, UNSPECIFIED TYPE: Primary | ICD-10-CM

## 2019-11-05 DIAGNOSIS — I21.4 NSTEMI (NON-ST ELEVATED MYOCARDIAL INFARCTION) (HCC): ICD-10-CM

## 2019-11-05 PROCEDURE — 99214 OFFICE O/P EST MOD 30 MIN: CPT | Performed by: INTERNAL MEDICINE

## 2019-11-05 PROCEDURE — 93000 ELECTROCARDIOGRAM COMPLETE: CPT | Performed by: INTERNAL MEDICINE

## 2019-11-05 RX ORDER — ISOSORBIDE MONONITRATE 30 MG/1
30 TABLET, EXTENDED RELEASE ORAL DAILY
Qty: 30 TABLET | Refills: 3 | Status: SHIPPED | OUTPATIENT
Start: 2019-11-05 | End: 2020-05-05

## 2019-12-02 ENCOUNTER — TELEPHONE (OUTPATIENT)
Dept: CARDIOLOGY CLINIC | Age: 54
End: 2019-12-02

## 2019-12-09 RX ORDER — METOPROLOL TARTRATE 100 MG/1
100 TABLET ORAL 2 TIMES DAILY
Qty: 180 TABLET | Refills: 3 | Status: SHIPPED | OUTPATIENT
Start: 2019-12-09 | End: 2020-11-09

## 2019-12-13 ENCOUNTER — PROCEDURE VISIT (OUTPATIENT)
Dept: CARDIOLOGY CLINIC | Age: 54
End: 2019-12-13
Payer: COMMERCIAL

## 2019-12-13 DIAGNOSIS — Z72.0 TOBACCO ABUSE: ICD-10-CM

## 2019-12-13 DIAGNOSIS — R07.9 CHEST PAIN, UNSPECIFIED TYPE: ICD-10-CM

## 2019-12-13 DIAGNOSIS — I21.4 NSTEMI (NON-ST ELEVATED MYOCARDIAL INFARCTION) (HCC): ICD-10-CM

## 2019-12-13 DIAGNOSIS — I25.10 ASCVD (ARTERIOSCLEROTIC CARDIOVASCULAR DISEASE): ICD-10-CM

## 2019-12-13 DIAGNOSIS — I25.2 H/O NON-ST ELEVATION MYOCARDIAL INFARCTION (NSTEMI): ICD-10-CM

## 2019-12-13 LAB
LV EF: 48 %
LVEF MODALITY: NORMAL

## 2019-12-13 PROCEDURE — 78452 HT MUSCLE IMAGE SPECT MULT: CPT | Performed by: INTERNAL MEDICINE

## 2019-12-13 PROCEDURE — A9500 TC99M SESTAMIBI: HCPCS | Performed by: INTERNAL MEDICINE

## 2019-12-13 PROCEDURE — 93016 CV STRESS TEST SUPVJ ONLY: CPT | Performed by: INTERNAL MEDICINE

## 2019-12-13 PROCEDURE — 93017 CV STRESS TEST TRACING ONLY: CPT | Performed by: INTERNAL MEDICINE

## 2019-12-13 PROCEDURE — 93018 CV STRESS TEST I&R ONLY: CPT | Performed by: INTERNAL MEDICINE

## 2019-12-16 ENCOUNTER — TELEPHONE (OUTPATIENT)
Dept: CARDIOLOGY CLINIC | Age: 54
End: 2019-12-16

## 2020-02-27 RX ORDER — OMEPRAZOLE 20 MG/1
20 CAPSULE, DELAYED RELEASE ORAL 2 TIMES DAILY
Qty: 180 CAPSULE | Refills: 3 | Status: SHIPPED | OUTPATIENT
Start: 2020-02-27 | End: 2021-05-24

## 2020-02-27 NOTE — TELEPHONE ENCOUNTER
Patient is out of this medication   And would like to increase the dose   Due some days he needs to take more   Than one

## 2020-05-05 ENCOUNTER — TELEMEDICINE (OUTPATIENT)
Dept: CARDIOLOGY CLINIC | Age: 55
End: 2020-05-05
Payer: COMMERCIAL

## 2020-05-05 VITALS — HEIGHT: 74 IN | BODY MASS INDEX: 33.75 KG/M2 | WEIGHT: 263 LBS

## 2020-05-05 PROCEDURE — 99214 OFFICE O/P EST MOD 30 MIN: CPT | Performed by: INTERNAL MEDICINE

## 2020-05-05 RX ORDER — NITROGLYCERIN 0.4 MG/1
0.4 TABLET SUBLINGUAL EVERY 5 MIN PRN
Qty: 25 TABLET | Refills: 3 | Status: SHIPPED | OUTPATIENT
Start: 2020-05-05 | End: 2021-05-13

## 2020-05-15 ENCOUNTER — HOSPITAL ENCOUNTER (OUTPATIENT)
Age: 55
Discharge: HOME | End: 2020-05-15
Payer: COMMERCIAL

## 2020-05-15 LAB
CHOLESTEROL: 147 MG/DL
HDLC SERPL-MCNC: 33 MG/DL
LDL CHOLESTEROL DIRECT: 98 MG/DL
TRIGL SERPL-MCNC: 143 MG/DL

## 2020-05-15 PROCEDURE — 83721 ASSAY OF BLOOD LIPOPROTEIN: CPT

## 2020-05-15 PROCEDURE — 80061 LIPID PANEL: CPT

## 2020-05-15 PROCEDURE — 36415 COLL VENOUS BLD VENIPUNCTURE: CPT

## 2020-06-05 RX ORDER — ATORVASTATIN CALCIUM 40 MG/1
40 TABLET, FILM COATED ORAL NIGHTLY
Qty: 90 TABLET | Refills: 3 | OUTPATIENT
Start: 2020-06-05

## 2020-06-05 RX ORDER — PRASUGREL 10 MG/1
10 TABLET, FILM COATED ORAL DAILY
Qty: 90 TABLET | Refills: 3 | OUTPATIENT
Start: 2020-06-05

## 2020-06-08 RX ORDER — ATORVASTATIN CALCIUM 40 MG/1
40 TABLET, FILM COATED ORAL NIGHTLY
Qty: 90 TABLET | Refills: 3 | Status: SHIPPED | OUTPATIENT
Start: 2020-06-08 | End: 2021-02-08 | Stop reason: SDUPTHER

## 2020-09-23 ENCOUNTER — OFFICE VISIT (OUTPATIENT)
Dept: PRIMARY CARE CLINIC | Age: 55
End: 2020-09-23
Payer: COMMERCIAL

## 2020-09-23 ENCOUNTER — HOSPITAL ENCOUNTER (OUTPATIENT)
Age: 55
Setting detail: SPECIMEN
Discharge: HOME | End: 2020-09-23
Payer: COMMERCIAL

## 2020-09-23 VITALS — HEART RATE: 67 BPM | OXYGEN SATURATION: 98 % | TEMPERATURE: 96.9 F

## 2020-09-23 PROCEDURE — U0002 COVID-19 LAB TEST NON-CDC: HCPCS

## 2020-09-23 PROCEDURE — 99213 OFFICE O/P EST LOW 20 MIN: CPT | Performed by: FAMILY MEDICINE

## 2020-09-23 NOTE — PROGRESS NOTES
distress. Breath sounds: Normal breath sounds. No wheezing. Skin:     General: Skin is warm and dry. Neurological:      Mental Status: He is alert and oriented to person, place, and time. TESTS:    POCT FLU:  [] Positive     []Negative    ASSESSMENT:    [] Flu  [x] Possible COVID-19 Likely Allergies  [] Strep    PLAN:    [x] Discharge home with written instructions for:  [] Flu management  [x] Possible COVID-19 infection with self-quarantine and management of symptoms  [x] Follow-up with primary care physician or emergency department if worsens  [] Evaluation per physician/nurse practitioner in clinic  [] Sent to ER    Suggested he could add sudafed of flonase to his daily claritin    An  electronic signature was used to authenticate this note.      --Tacos Santamaria MD on 9/23/2020 at 11:03 AM

## 2020-09-23 NOTE — PATIENT INSTRUCTIONS
Your COVID 19 test can take 3-5 days for the results come back. We ask that you make a Mychart page and view your test results this way. You will need to Self quarantine until you know your results. Increase fluids rest  Saline nasal spray as directed  Warm salt gargles for throat discomfort  Monitor temperature twice a day  Tylenol for fevers and/or discomfort. If symptoms are worse -Go to the ER. Follow up with your primary doctor    To Whom it May Concern:    Lona Reyes has been tested for COVID on 09/23/20. They may NOT return to work until their lab test results back and they been fever free for 3 days. If test is positive they must stay home for 2 weeks or until they test negative or as directed by the [facility] Department.

## 2020-09-24 LAB
SARS-COV-2: NOT DETECTED
SOURCE: NORMAL

## 2020-11-09 RX ORDER — METOPROLOL TARTRATE 100 MG/1
100 TABLET ORAL 2 TIMES DAILY
Qty: 60 TABLET | Refills: 3 | Status: SHIPPED | OUTPATIENT
Start: 2020-11-09 | End: 2021-02-08

## 2020-11-16 ENCOUNTER — TELEMEDICINE (OUTPATIENT)
Dept: CARDIOLOGY CLINIC | Age: 55
End: 2020-11-16
Payer: COMMERCIAL

## 2020-11-16 PROCEDURE — 99214 OFFICE O/P EST MOD 30 MIN: CPT | Performed by: INTERNAL MEDICINE

## 2020-11-16 NOTE — PROGRESS NOTES
CARDIOLOGY  NOTE                2020    TELEHEALTH EVALUATION -- Audio/Visual (During SAVLT-47 public health emergency)      Kerri Singh (:  1965) has requested an audio/video evaluation for the following concern(s):  Chief Complaint: ASCVD    HPI:   Lois Cerda is a 47y.o. year old who has history as noted below. We spoke using video conferencing he was at work he says he is doing well energy levels are good  Lois Cerda has history of NSTEMI pci to RCA in 2019. He had normal stress test in Dec 2019 .still smokes half a pack of smoke daily    He did take testosterone injections and supplements after which his dystonia levels have improved but did not notice any significant difference in energy level except for gaining 14 pounds  He feels that in the past he could not tolerate statins and lately has started noticing that he needs to stretch his muscles more?   Denies muscle pain as such        Current Outpatient Medications   Medication Sig Dispense Refill    metoprolol (LOPRESSOR) 100 MG tablet TAKE 1 TABLET BY MOUTH 2 TIMES DAILY 60 tablet 3    atorvastatin (LIPITOR) 40 MG tablet Take 1 tablet by mouth nightly 90 tablet 3    nitroGLYCERIN (NITROSTAT) 0.4 MG SL tablet Place 1 tablet under the tongue every 5 minutes as needed for Chest pain 25 tablet 3    omeprazole (PRILOSEC) 20 MG delayed release capsule Take 1 capsule by mouth 2 times daily (Patient taking differently: Take 20 mg by mouth Daily ) 180 capsule 3    Vitamin E 400 units TABS Take by mouth daily      Carboxymethylcellulose Sodium (EYE DROPS OP) Apply to eye daily      TESTOSTERONE AQUEOUS IM Inject into the muscle every 14 days Indications: opted out of injection on 19       aspirin 81 MG chewable tablet Take 1 tablet by mouth daily Enteric formulation (Patient taking differently: Take 81 mg by mouth every morning Enteric formulation) 90 tablet 3    nitroGLYCERIN (NITROSTAT) 0.4 MG SL tablet up to max of 3 total doses. If no relief after 1 dose, call 911. 30 tablet 0    loratadine (CLARITIN) 10 MG tablet Take 5 mg by mouth every morning        No current facility-administered medications for this visit. Allergies:   Patient has no known allergies. Patient History:  Past Medical History:   Diagnosis Date    Arthritis     CAD (coronary artery disease)     H/O cardiovascular stress test 2019    Significant PVC burdon which reduced with exercise    H/O echocardiogram 2019    55-60%  Frequest PVCs    History of nuclear stress test 2019    cardiolite- normal    Hyperlipidemia     Hypertension     NSTEMI (non-ST elevated myocardial infarction) ([facility] [address] 2019    S/P PTCA (percutaneous transluminal coronary angioplasty) 02/02/2019    X 3 stent RCA mid and Distal, PDA     Past Surgical History:   Procedure Laterality Date    CARDIAC SURGERY  2019    heart cath with three stents    COLONOSCOPY  2019    colon polyps, diverticulosis coli    COLONOSCOPY N/A 2019    COLONOSCOPY POLYPECTOMY SNARE/COLD BIOPSY performed by Gee Mathew MD at [address] Right     inguinal     No family history on file. Social History     Tobacco Use    Smoking status: Current Every Day Smoker     Packs/day: 0.50     Last attempt to quit: 2019     Years since quittin.7    Smokeless tobacco: Never Used    Tobacco comment: 6 cigarettes    Substance Use Topics    Alcohol use: Yes     Comment: occasionally        Review of Systems:   · Constitutional: No Fever or Weight Loss   · Eyes: No Decreased Vision  · ENT: No Headaches, Hearing Loss or Vertigo  · Cardiovascular: as per note above   · Respiratory: No cough or wheezing and as per note above.    · Gastrointestinal: No abdominal pain, appetite loss, blood in stools, constipation, diarrhea or heartburn  · Genitourinary: No dysuria, trouble voiding, or hematuria  · Musculoskeletal:  None  · Integumentary: No rash or pruritis  · Neurological: No TIA or stroke symptoms  · Psychiatric: No anxiety or depression  · Endocrine: No malaise, fatigue or temperature intolerance  · Hematologic/Lymphatic: No bleeding problems, blood clots or swollen lymph nodes  · Allergic/Immunologic: No nasal congestion or hives    Objective:      Physical Exam:  There were no vitals taken for this visit. Wt Readings from Last 3 Encounters:   05/05/20 263 lb (119.3 kg)   11/05/19 263 lb (119.3 kg)   11/01/19 263 lb (119.3 kg)     There is no height or weight on file to calculate BMI. There were no vitals filed for this visit. PHYSICAL EXAMINATION:    Vital Signs: (As obtained by patient/caregiver or practitioner observation)    Blood pressure-  Heart rate-    Respiratory rate-    Temperature-  Pulse oximetry-     Constitutional: [x] Appears well-developed and well-nourished [x] No apparent distress      [] Abnormal-   Mental status  [x] Alert and awake  [x] Oriented to person/place/time [x]Able to follow commands      Eyes:  EOM    [x]  Normal  [] Abnormal-  Sclera  [x]  Normal  [] Abnormal -         Discharge [x]  None visible  [] Abnormal -    HENT:   [x] Normocephalic, atraumatic.   [] Abnormal   [x] Mouth/Throat: Mucous membranes are moist.     External Ears [x] Normal  [] Abnormal-     Neck: [x] No visualized mass     Pulmonary/Chest: [x] Respiratory effort normal.  [x] No visualized signs of difficulty breathing or respiratory distress        [] Abnormal-      Musculoskeletal:   [x] Normal gait with no signs of ataxia         [x] Normal range of motion of neck        [] Abnormal-       Neurological:        [x] No Facial Asymmetry (Cranial nerve 7 motor function) (limited exam to video visit)          [x] No gaze palsy        [] Abnormal-         Skin:        [x] No significant exanthematous lesions or discoloration noted on facial skin         [] Abnormal- 02/02/2019     No results found for: LABA1C  Lab Results   Component Value Date    WBC 10.5 02/04/2019    HGB 13.2 (L) 02/04/2019    HCT 39.6 (L) 02/04/2019     02/04/2019     Cath 2/2/19   Procedure Summary   Indication: NSTEMI   Access: radial   1. 90 % stenosis of mid RCA and distal RCA has 90 % lesion ,   ostial PDA had 90 % lesion   2. s/p PCI to MID and Distal RCA , PCi to Ostial PDA using   overlapping stent extending from distal RCA to ostial PDA using   2.5 X 20 STEVE   3. 2.75 X 23 and 3.0 X 23 overlapping stents to proximal and mid   RCA reduced 90% lesion to 0 % lesion   4. LAD has minor irregularity   5. Circ is patent   6. LVEDP was 13 mmHG    Echo 2/4/19  Summary   Left ventricular systolic function is normal.   Ejection fraction is visually estimated at 55-60%.   Left ventricle size is normal.   No regional wall motion abnormalities were detected.   Mildly dilated left atrium.   No evidence of any pericardial effusion.   Frequent Pvc noted     Stress test 2/19/19  Conclusions      Summary   Appropriate hemodynamic response to exercise. No significant ST T wave   changes with exercise. EKG portion is negative for ischemia by diagnostic   criteria. But Significant PVC burden which reduce with exercise      The patient exercised according to the [facility] for 8:59 min:s, achieving a   work level of Max. METS: 10.10. The resting heart rate of 83 bpm akil to a   maximal heart rate of 134 bpm. This value represents 80 % of the maximal,   age-predicted heart rate. The resting blood pressure of 142/70 mmHg , akil   to a maximum blood pressure of 168/88 mmHg.         All labs, medications and tests reviewed by myself including data and history from outside source , patient and available family . Assessment & Plan:      1. ASCVD (arteriosclerotic cardiovascular disease)    2. NSTEMI (non-ST elevated myocardial infarction) ([address]    3. Tobacco abuse    4.  H/O non-ST elevation myocardial infarction (NSTEMI) ASCVD (arteriosclerotic cardiovascular disease)  S/p PCI to RCA for NSTEMI in Feb 2019. He can stop effient but continue aspirin 81 mg , continue statins. . He refused to go to Rehab after his MI , He stopped imdur due to \"feeling funny\"he is not exercising anymore , stress test in Dec 2019 showed no ischemia. Unfortunately not very compliant with diet and smoking again      Fatigue  He really thinks his  Low energy is due to low testosterone level . He understands he is at  increased cardiac events with  testosterone replacement     Tobacco abuse  He is smoking again   He was encouraged not to smoke anymore     Dyslipidemia :  All available lab work was reviewed. Patient was advised to repeat lab work before next visit  He has high triglyceride levels and very high LDL. He has been intolerant of statins in the past.  For now I will continue to use Lipitor 40 mg daily. Check lab work      Counseled extensively and medication compliance urged. We discussed that for the  prevention of ASCVD our  goal is aggressive risk modification. Patient is encouraged to exercise even a brisk walk for 30 minutes  at least 3 to 4 times a week   Various goals were discussed and questions answered. Continue current medications. Appropriate prescriptions are addressed and refills ordered. Questions answered and patient verbalizes understanding. Call for any problems, questions, or concerns. Continue all other medications of all above medical condition listed as is. Return in about 6 months (around 5/16/2021). Please note this report has been partially produced using speech recognition software and may contain errors related to that system including errors in grammar, punctuation, and spelling, as well as words and phrases that may be inappropriate.  If there are any questions or concerns please feel free to contact the dictating provider for clarification.     An  electronic signature was used to authenticate this note.    --Andrew Watters MD on 5/5/2020 at 12:48 PM    [id number]}    I confirm that this visit was completed in a telehealth setting ,using synchronous audiovisual technology for real time patient interaction . The patient identity with name and date of birth was confirmed . This evaluation of patient was done by telehealth in the setting of ABZXA-74 Public health emergency , which precluded assurance of safe in person visit at the time of service. The patient consented to and accepts potential risks associated with telemedical evaluation and care was taken to assess chinmay presence of any medical issues that would be more  appropriate for expedited in -person care. Pursuant to the emergency declaration under the [facility] waiver authority and the [employer] and [employer] Act, this Virtual  Visit was conducted, with patient's consent, to reduce the patient's risk of exposure to COVID-19 and provide continuity of care for an established patient. Services were provided through a video synchronous discussion virtually to substitute for in-person clinic visit. I Affirm this is a Patient Initiated Episode with an Established Patient who has not had a related appointment within my department in the past 7 days or scheduled within the next 24 hours.     Total Time: minutes: 21-30 minutes

## 2020-11-16 NOTE — LETTER
Elio Alaniz  1965  [id number]    Have you had any Chest Pain that is not new? - No     Have you had any Shortness of Breath - No    Have you had any dizziness - No    Have you had any palpitations that are not new?  - No    Do you have any edema - denies    Do you have a surgery or procedure scheduled in the near future - No

## 2020-12-12 ENCOUNTER — HOSPITAL ENCOUNTER (OUTPATIENT)
Age: 55
Discharge: HOME | End: 2020-12-12
Payer: COMMERCIAL

## 2020-12-12 LAB
CHOLESTEROL: 148 MG/DL
HDLC SERPL-MCNC: 29 MG/DL
LDL CHOLESTEROL DIRECT: 99 MG/DL
TRIGL SERPL-MCNC: 153 MG/DL

## 2020-12-12 PROCEDURE — 80061 LIPID PANEL: CPT

## 2020-12-12 PROCEDURE — 83721 ASSAY OF BLOOD LIPOPROTEIN: CPT

## 2020-12-12 PROCEDURE — 36415 COLL VENOUS BLD VENIPUNCTURE: CPT

## 2021-02-08 RX ORDER — METOPROLOL TARTRATE 100 MG/1
100 TABLET ORAL 2 TIMES DAILY
Qty: 180 TABLET | Refills: 3 | Status: SHIPPED | OUTPATIENT
Start: 2021-02-08 | End: 2022-03-08 | Stop reason: SDUPTHER

## 2021-02-08 RX ORDER — ATORVASTATIN CALCIUM 40 MG/1
40 TABLET, FILM COATED ORAL NIGHTLY
Qty: 90 TABLET | Refills: 3 | Status: SHIPPED | OUTPATIENT
Start: 2021-02-08 | End: 2022-03-08 | Stop reason: SDUPTHER

## 2021-02-08 RX ORDER — METOPROLOL TARTRATE 100 MG/1
100 TABLET ORAL 2 TIMES DAILY
Qty: 60 TABLET | Refills: 5 | Status: SHIPPED | OUTPATIENT
Start: 2021-02-08 | End: 2021-02-08 | Stop reason: SDUPTHER

## 2021-02-08 NOTE — TELEPHONE ENCOUNTER
90 day refill of Metoprolol 100mg and atorvastatin 40mg sent to [facility] pharmacy on [address] [facility]

## 2021-03-15 ENCOUNTER — PATIENT MESSAGE (OUTPATIENT)
Dept: CARDIOLOGY CLINIC | Age: 56
End: 2021-03-15

## 2021-03-19 NOTE — PROGRESS NOTES
CARDIOLOGY  NOTE                2020    TELEHEALTH EVALUATION -- Audio/Visual (During FNOXD-94 public health emergency)      Yuliana Brooks (:  1965) has requested an audio/video evaluation for the following concern(s):  Chief Complaint: ASCVD    HPI:   Frances Ravi is a 47y.o. year old who has history as noted below. History of NSTEMI pci to RCA in 2019. He gets intermittent chest discomfort \"here and there \" but lasted all day few weeks ago he did not use any nitro   He has not had any more symptoms since then. He had normal stress test in Dec 2019 . Hlf a pack of smoke daily   Unfortunately he is smoking again .   He did take testosterone injections and supplements after which his dystonia levels have improved but did not notice any significant difference in energy level except for gaining 14 pounds        Current Outpatient Medications   Medication Sig Dispense Refill    nitroGLYCERIN (NITROSTAT) 0.4 MG SL tablet Place 1 tablet under the tongue every 5 minutes as needed for Chest pain 25 tablet 3    omeprazole (PRILOSEC) 20 MG delayed release capsule Take 1 capsule by mouth 2 times daily (Patient taking differently: Take 20 mg by mouth Daily ) 180 capsule 3    metoprolol (LOPRESSOR) 100 MG tablet Take 1 tablet by mouth 2 times daily (Patient taking differently: Take 100 mg by mouth daily ) 180 tablet 3    Vitamin E 400 units TABS Take by mouth daily      Carboxymethylcellulose Sodium (EYE DROPS OP) Apply to eye daily      TESTOSTERONE AQUEOUS IM Inject into the muscle every 14 days Indications: opted out of injection on 19       atorvastatin (LIPITOR) 40 MG tablet Take 1 tablet by mouth nightly 90 tablet 3    aspirin 81 MG chewable tablet Take 1 tablet by mouth daily Enteric formulation (Patient taking differently: Take 81 mg by mouth every morning Enteric formulation) 90 tablet 3    nitroGLYCERIN (NITROSTAT) 0.4 MG SL tablet up to max of 3 total doses. If no relief after 1 dose, call 911. 30 tablet 0    loratadine (CLARITIN) 10 MG tablet Take 5 mg by mouth every morning        No current facility-administered medications for this visit. Allergies:   Patient has no known allergies. Patient History:  Past Medical History:   Diagnosis Date    Arthritis     CAD (coronary artery disease)     H/O cardiovascular stress test 2019    Significant PVC burdon which reduced with exercise    H/O echocardiogram 2019    55-60%  Frequest PVCs    History of nuclear stress test 2019    cardiolite- normal    Hyperlipidemia     Hypertension     NSTEMI (non-ST elevated myocardial infarction) ([facility] [address] 2019    S/P PTCA (percutaneous transluminal coronary angioplasty) 02/02/2019    X 3 stent RCA mid and Distal, PDA     Past Surgical History:   Procedure Laterality Date    CARDIAC SURGERY  2019    heart cath with three stents    COLONOSCOPY  2019    colon polyps, diverticulosis coli    COLONOSCOPY N/A 2019    COLONOSCOPY POLYPECTOMY SNARE/COLD BIOPSY [facility] Avril [facility]a [facility] Right     inguinal     History reviewed. No pertinent family history. Social History     Tobacco Use    Smoking status: Current Every Day Smoker     Packs/day: 0.50     Last attempt to quit: 2019     Years since quittin.2    Smokeless tobacco: Never Used    Tobacco comment: 6 cigarettes    Substance Use Topics    Alcohol use: Yes     Comment: occasionally        Review of Systems:   · Constitutional: No Fever or Weight Loss   · Eyes: No Decreased Vision  · ENT: No Headaches, Hearing Loss or Vertigo  · Cardiovascular: as per note above   · Respiratory: No cough or wheezing and as per note above.    · Gastrointestinal: No abdominal pain, appetite loss, blood in stools, constipation, diarrhea or heartburn  · Genitourinary: No dysuria, trouble voiding, or negative

## 2021-05-13 ENCOUNTER — OFFICE VISIT (OUTPATIENT)
Dept: CARDIOLOGY CLINIC | Age: 56
End: 2021-05-13
Payer: COMMERCIAL

## 2021-05-13 VITALS
DIASTOLIC BLOOD PRESSURE: 70 MMHG | WEIGHT: 264.8 LBS | BODY MASS INDEX: 33.98 KG/M2 | SYSTOLIC BLOOD PRESSURE: 106 MMHG | HEIGHT: 74 IN

## 2021-05-13 DIAGNOSIS — Z72.0 TOBACCO ABUSE: ICD-10-CM

## 2021-05-13 DIAGNOSIS — I10 ESSENTIAL HYPERTENSION: ICD-10-CM

## 2021-05-13 DIAGNOSIS — I25.10 ASCVD (ARTERIOSCLEROTIC CARDIOVASCULAR DISEASE): Primary | ICD-10-CM

## 2021-05-13 DIAGNOSIS — E78.2 MIXED HYPERLIPIDEMIA: ICD-10-CM

## 2021-05-13 PROBLEM — I21.4 NSTEMI (NON-ST ELEVATED MYOCARDIAL INFARCTION) (HCC): Status: RESOLVED | Noted: 2019-02-02 | Resolved: 2021-05-13

## 2021-05-13 PROCEDURE — 93000 ELECTROCARDIOGRAM COMPLETE: CPT | Performed by: NURSE PRACTITIONER

## 2021-05-13 PROCEDURE — 99214 OFFICE O/P EST MOD 30 MIN: CPT | Performed by: NURSE PRACTITIONER

## 2021-05-13 ASSESSMENT — ENCOUNTER SYMPTOMS
SHORTNESS OF BREATH: 0
COUGH: 0

## 2021-05-13 NOTE — ASSESSMENT & PLAN NOTE
 Patient is using tobacco at this time   Encouraged to stop  [employer], medicinal, and social support   Patient is not ready to quit at this time

## 2021-05-13 NOTE — PROGRESS NOTES
[facility] [facility],Third [address]  Phone: [phone]    Fax [phone]    Lincoln Adamson MD, Lane Jacinto MD, Chon Cho MD, MD Juan Marquez MD Kristan Stare, MD Janeen Griffes, MD Berkley Arrington, APRN      Stefany Jhaveri, APRJUAN Ricks, [facility], [facility]    CARDIOLOGY  NOTE    2021    Jacquelin Zamora (:  1965) is a 54 y.o. male,Established patient with Dr. Ross Camarena, here for evaluation of the following chief complaint(s):  Coronary Artery Disease        SUBJECTIVE/OBJECTIVE:    Patient is her to follow up on the following:  ASCVD (arteriosclerotic cardiovascular disease)  (primary encounter diagnosis)  Essential hypertension  Mixed hyperlipidemia  Tobacco abuse    Patient has been feeling fairly well. He reports that he is not following a specific diet and continues to smoke. He occasionally has a poking in his chest on the left side that is in the same place every time. It does not last more than 5 minutes and is random in its occurrence. Patient is not sure if it is related to anything in particular. Poking sensation is not present on exertion. Patient initially presented to the ED with arm heaviness and feeling off and was found to have NSTEMI 2019. He underwent a PCI of LAD. Stress test in 2019 was normal. He has not been tolerant of higher doses of statins due to myalgias. He has been noticing some occasional muscle cramping but it has been tolerable. Patient denies issues obtaining or taking medications. Patient is active and does not do organized exercise. Patient denies chest pain, shortness of breath, palpitations, dizziness, orthopnea, lower leg swelling, or syncope. Review of Systems   Constitutional: Negative for fatigue and fever. Respiratory: Negative for cough and shortness of breath.     Cardiovascular: Negative for chest pain, palpitations hemodynamic response to exercise. No significant ST T wave changes with exercise. EKG portion is negative for ischemia by diagnostic criteria. But Significant PVC burden which reduce with exercise      The patient exercised according to the [facility] for 8:59 min:s, achieving a work level of Max. METS: 10.10. The resting heart rate of 83 bpm akil to a maximal heart rate of 134 bpm. This value represents 80 % of the maximal, age-predicted heart rate. The resting blood pressure of 142/70 mmHg , akil to a maximum blood pressure of 168/88 mmHg. ASSESSMENT/PLAN:    1. ASCVD (arteriosclerotic cardiovascular disease)  Assessment & Plan:   Patient had stents placed    Patient is not having cardiac symptoms   continue Metoprolol, Lipid, and ASA   Low salt, Low cholesterol diet   EKG does not have acute abnormalities or significant changes from previous EKG    2. Essential hypertension  Assessment & Plan:   Controlled   Continue metoprolol, ASA, lipitor   advised low salt diet   3. Mixed hyperlipidemia  Assessment & Plan:   Lipid panel reviewed   Patient LDL is not at goal, HDL is notat goal, triglycerides high   Continue Lipitor (atorvastatin). Discussed adding CoQ10 and omega 3 fatty acids to medications to assist in decreasing his LDL and assist in minimizing side effects of statin therapy.  Discussed with the patient the need for exercise, low cholesterol diet, and compliance with medications. Results for Antonina Berry (MRN [id number]) as of 5/13/2021 08:55   Ref. Range 12/12/2020 11:36   Cholesterol Latest Ref Range: <200 MG/   HDL Cholesterol Latest Ref Range: >40 MG/DL 29 (L)   LDL Direct Latest Ref Range: <100 MG/DL 99   Triglycerides Latest Ref Range: <150 MG/ (H)     Orders:  -     Comprehensive Metabolic Panel; Future  -     Lipid Panel; Future  4.  Tobacco abuse  Assessment & Plan:   Patient is using tobacco at this time   Encouraged to stop  [employer], [employer], and social support   Patient is not ready to quit at this time      Return in about 6 months (around 11/13/2021) for with Dr. Suzy Marroquin, or rian if needed. An electronic signature was used to authenticate this note.     Electronically signed by DREW Parikh CNP on 5/13/2021 at 8:51 AM

## 2021-05-13 NOTE — ASSESSMENT & PLAN NOTE
 Lipid panel reviewed   Patient LDL is not at goal, HDL is notat goal, triglycerides high   Continue Lipitor (atorvastatin). Discussed adding CoQ10 and omega 3 fatty acids to medications to assist in decreasing his LDL and assist in minimizing side effects of statin therapy.  Discussed with the patient the need for exercise, low cholesterol diet, and compliance with medications. Results for Mag Gavin (MRN [id number]) as of 5/13/2021 08:55   Ref.  Range 12/12/2020 11:36   Cholesterol Latest Ref Range: <200 MG/   HDL Cholesterol Latest Ref Range: >40 MG/DL 29 (L)   LDL Direct Latest Ref Range: <100 MG/DL 99   Triglycerides Latest Ref Range: <150 MG/ (H)

## 2021-05-13 NOTE — LETTER
Maria Guadalupe Funez  1965  [id number]    Have you had any Chest Pain that is not new? - Yes  If Yes DO EKG - How does it feel - twinges   How long does the pain last - 3 minutes    How long have you been having the pain - not sure   Did you take a NO   And did it relieve the pain - it goes away on it's own      Have you had any Shortness of Breath - Yes  If Yes - When lying down    Have you had any dizziness - Yes  If Yes DO ORTHOSTATIC BP - when do you feel dizzy laying down and rolled over  How long does it last .3  seconds      Sitting wait 5 minutes do supine (laying down) wait 5 minutes then do standing - log each in \"vitals\" area in Epic   Be sure to ask what symptoms they are having if they get dizzy while completing ortho stats such as: room spinning, nausea, etc.    Have you had any palpitations that are not new? - No       Do you have any edema - swelling in No    If Yes - CHECK TO SEE IF THE EDEMA IS PITTING    When did you have your last labs drawn 12/12/20220  Where did you have them done  What doctor ordered Dr. Georges Spine    If we do not have these labs you are retrieve these labs for these providers!     Do you have a surgery or procedure scheduled in the near future - No  If Yes- DO EKG

## 2021-05-20 ENCOUNTER — HOSPITAL ENCOUNTER (OUTPATIENT)
Age: 56
Discharge: HOME | End: 2021-05-20
Payer: COMMERCIAL

## 2021-05-20 LAB
PROCALCITONIN: 0.04
PROSTATE SPECIFIC ANTIGEN: 3.55 NG/ML (ref 0–4)

## 2021-05-20 PROCEDURE — 84403 ASSAY OF TOTAL TESTOSTERONE: CPT

## 2021-05-20 PROCEDURE — 84270 ASSAY OF SEX HORMONE GLOBUL: CPT

## 2021-05-20 PROCEDURE — G0103 PSA SCREENING: HCPCS

## 2021-05-20 PROCEDURE — 84145 PROCALCITONIN (PCT): CPT

## 2021-05-20 PROCEDURE — 36415 COLL VENOUS BLD VENIPUNCTURE: CPT

## 2021-05-21 LAB
SEX HORMONE BINDING GLOBULIN: 26 NMOL/L (ref 11–80)
TESTOSTERONE FREE PERCENT: 2 % (ref 1.6–2.9)
TESTOSTERONE FREE: 54 PG/ML (ref 47–244)
TESTOSTERONE TOTAL-MALE: 271 NG/DL (ref 300–890)

## 2021-05-24 RX ORDER — OMEPRAZOLE 20 MG/1
20 CAPSULE, DELAYED RELEASE ORAL 2 TIMES DAILY
Qty: 60 CAPSULE | Refills: 3 | Status: SHIPPED | OUTPATIENT
Start: 2021-05-24 | End: 2021-12-08 | Stop reason: SDUPTHER

## 2021-11-23 ENCOUNTER — HOSPITAL ENCOUNTER (OUTPATIENT)
Age: 56
Discharge: HOME | End: 2021-11-23
Payer: COMMERCIAL

## 2021-11-23 PROCEDURE — 84154 ASSAY OF PSA FREE: CPT

## 2021-11-23 PROCEDURE — 84153 ASSAY OF PSA TOTAL: CPT

## 2021-11-23 PROCEDURE — 36415 COLL VENOUS BLD VENIPUNCTURE: CPT

## 2021-11-26 LAB
PROSTATE SPECIFIC ANTIGEN FREE: 0.5 NG/ML
PROSTATE SPECIFIC ANTIGEN PERCENT FREE: 20 %
PROSTATE SPECIFIC ANTIGEN: 2.5 NG/ML (ref 0–4)

## 2021-11-30 ENCOUNTER — OFFICE VISIT (OUTPATIENT)
Dept: CARDIOLOGY CLINIC | Age: 56
End: 2021-11-30
Payer: COMMERCIAL

## 2021-11-30 VITALS
SYSTOLIC BLOOD PRESSURE: 116 MMHG | WEIGHT: 266 LBS | DIASTOLIC BLOOD PRESSURE: 72 MMHG | HEART RATE: 68 BPM | BODY MASS INDEX: 34.14 KG/M2 | HEIGHT: 74 IN

## 2021-11-30 DIAGNOSIS — I25.10 ASCVD (ARTERIOSCLEROTIC CARDIOVASCULAR DISEASE): ICD-10-CM

## 2021-11-30 DIAGNOSIS — I10 PRIMARY HYPERTENSION: ICD-10-CM

## 2021-11-30 DIAGNOSIS — E78.2 MIXED HYPERLIPIDEMIA: Primary | ICD-10-CM

## 2021-11-30 DIAGNOSIS — Z72.0 TOBACCO ABUSE: ICD-10-CM

## 2021-11-30 DIAGNOSIS — I25.2 H/O NON-ST ELEVATION MYOCARDIAL INFARCTION (NSTEMI): ICD-10-CM

## 2021-11-30 PROCEDURE — 99214 OFFICE O/P EST MOD 30 MIN: CPT | Performed by: INTERNAL MEDICINE

## 2021-11-30 RX ORDER — CHLORAL HYDRATE 500 MG
3000 CAPSULE ORAL 2 TIMES DAILY
COMMUNITY

## 2021-11-30 RX ORDER — AMPICILLIN TRIHYDRATE 250 MG
CAPSULE ORAL 2 TIMES DAILY
COMMUNITY

## 2021-11-30 RX ORDER — ACETAMINOPHEN 160 MG
TABLET,DISINTEGRATING ORAL
COMMUNITY

## 2021-11-30 RX ORDER — ZINC GLUCONATE 50 MG
50 TABLET ORAL DAILY
COMMUNITY

## 2021-11-30 NOTE — PATIENT INSTRUCTIONS
Please be informed that if you contact our office outside of normal business hours the physician on call cannot help with any scheduling or rescheduling issues, procedure instruction questions or any type of medication issue. We advise you for any urgent/emergency that you go to the nearest emergency room! PLEASE CALL OUR OFFICE DURING NORMAL BUSINESS HOURS    Monday - Friday   8 am to 5 pm    Jayleen Verg[facility] [address]: [phone]    [place]:  [phone]    **It is YOUR responsibilty to bring medication bottles and/or updated medication list to [address] [place] [address]. This will allow us to better serve you and all your healthcare needs**    Please hold on to these instructions the  will call you within 1-9 business days when we receive authorization from your insurance. Nuclear Stress Test    WHAT TO EXPECT:   ? You will need to confirm the test or it could be cancelled. ? This test will take approximately 2 hours: 1 hour in the AM &    1 hour in the PM. You will be given a time by the Technologist after the first part is completed to come back. ? You will be given a medication, through an IV in the hand, this will safely simulate exercise. This IV is also needed to inject the radioactive isotope unless you are able toe walk the treadmill. ? You will receive an injection in the AM & PM before the pictures. ? Using a special camera, you will have one set of pictures of your heart taken in the AM and a set of pictures in the PM.     PREPARATION FOR TEST:  ? Eat a light breakfast such as water or juice and toast.  ? If you are DIABETIC: Eat a normal breakfast with NO CAFFEINE and take your insulin as normal.   ? AVOID ALL FOODS & DRINKS containing CAFFEINE 12 HOURS PRIOR TO THE TEST: Including coffee, Tea, Padmini and other soft drinks even those labeled  caffeine free or decaffeinated.  ?  HOLD THESE MEDICATIONS Persantine & Theophylline (Theodur)  24 hours prior & bring your inhaler with you.   The physician will specify if the following Beta blockers need to be held for 24 hours prior to test: Lopressor (metoprolol)

## 2021-11-30 NOTE — LETTER
5/2021  Where did you have them done   What doctor ordered     If we do not have these labs you are retrieve these labs for these providers! Do you have a surgery or procedure scheduled in the near future - No  If Yes- DO EKG  If Yes - Who is the surgery with?    Phone number of physician   Fax number of physician   Type of surgery   GIVE THIS INFORMATION TO ALBIN GUARDADO     Ask patient if they want to sign up for MyChart if they are not already signed up     Check to see if we have an E-MAIL on file for the patient     Check medication list thoroughly!!! AND RECONCILE OUTSIDE MEDICATIONS  If dose has changed change the entire order not just the [place] At check out add to every patient's \"wrap up\" the following dot phrase AFTERHOURSEDUCATION and ensure we explain this to our patients

## 2021-11-30 NOTE — PROGRESS NOTES
CARDIOLOGY  NOTE                Chief Complaint: ASCVD    HPI:   Pascual Haij is a 54y.o. year old who has history as noted below. Pascual Haji says he got covid and still not has sense of smell or taste. HE notices he gest winded when he walking from his parking to office   Pascual Haji has history of NSTEMI pci to RCA in Feb 2019.( had pressure behind both shoulders and felt like walking in a fog as presentation)  He had normal stress test in Dec 2019 .still smokes half a pack of smoke daily   He gest feeling of chest feeling \"weird \" but no chest pain , occurs at rest    He did take testosterone injections and supplements after which his dystonia levels have improved but did not notice any significant difference in energy level except for gaining 14 pounds  He feels that in the past he could not tolerate statins and lately has started noticing that he needs to stretch his muscles more?           Current Outpatient Medications   Medication Sig Dispense Refill    Coenzyme Q10 (COQ10) 200 MG CAPS Take by mouth 2 times daily      Omega-3 Fatty Acids (FISH OIL) 1000 MG CAPS Take 3,000 mg by mouth 2 times daily      Cholecalciferol (VITAMIN D3) 50 MCG (2000 UT) CAPS Take by mouth      zinc gluconate 50 MG tablet Take 50 mg by mouth daily      omeprazole (PRILOSEC) 20 MG delayed release capsule TAKE 1 CAPSULE BY MOUTH 2 TIMES DAILY 60 capsule 3    metoprolol (LOPRESSOR) 100 MG tablet Take 1 tablet by mouth 2 times daily 180 tablet 3    atorvastatin (LIPITOR) 40 MG tablet Take 1 tablet by mouth nightly 90 tablet 3    Vitamin E 400 units TABS Take by mouth daily      Carboxymethylcellulose Sodium (EYE DROPS OP) Apply to eye daily      aspirin 81 MG chewable tablet Take 1 tablet by mouth daily Enteric formulation (Patient taking differently: Take 81 mg by mouth every morning Enteric formulation) 90 tablet 3    loratadine (CLARITIN) 10 MG tablet Take 5 mg by mouth every morning       nitroGLYCERIN (NITROSTAT) 0.4 MG SL tablet up to max of 3 total doses. If no relief after 1 dose, call 911. (Patient not taking: Reported on 2021) 30 tablet 0     No current facility-administered medications for this visit. Allergies:   Patient has no known allergies. Patient History:  Past Medical History:   Diagnosis Date    Arthritis     CAD (coronary artery disease)     Elevated prostate specific antigen (PSA) 2021    H/O cardiovascular stress test 2019    Significant PVC burdon which reduced with exercise    H/O echocardiogram 2019    55-60%  Frequest PVCs    History of nuclear stress test 2019    cardiolite- normal    Hyperlipidemia     Hypertension     NSTEMI (non-ST elevated myocardial infarction) ([facility] [address] 2019    NSTEMI (non-ST elevated myocardial infarction) ([facility] [address] 2019    S/P PTCA (percutaneous transluminal coronary angioplasty) 02/02/2019    X 3 stent RCA mid and Distal, PDA     Past Surgical History:   Procedure Laterality Date    CARDIAC SURGERY  2019    heart cath with three stents    COLONOSCOPY  2019    colon polyps, diverticulosis coli    COLONOSCOPY N/A 2019    COLONOSCOPY POLYPECTOMY SNARE/COLD BIOPSY performed by Emily Rooney MD at [address] [place] [address] Right     inguinal     History reviewed. No pertinent family history. Social History     Tobacco Use    Smoking status: Current Every Day Smoker     Packs/day: 0.50     Last attempt to quit: 2019     Years since quittin.8    Smokeless tobacco: Never Used    Tobacco comment: 6 cigarettes    Substance Use Topics    Alcohol use: Yes     Comment: occasionally        Review of Systems:   · Constitutional: No Fever or Weight Loss   · Eyes: No Decreased Vision  · ENT: No Headaches, Hearing Loss or Vertigo  · Cardiovascular: as per note above   · Respiratory: No cough or wheezing and as per note above.    · Gastrointestinal: No abdominal pain, appetite loss, blood in stools, constipation, diarrhea or heartburn  · Genitourinary: No dysuria, trouble voiding, or hematuria  · Musculoskeletal:  None  · Integumentary: No rash or pruritis  · Neurological: No TIA or stroke symptoms  · Psychiatric: No anxiety or depression  · Endocrine: No malaise, fatigue or temperature intolerance  · Hematologic/Lymphatic: No bleeding problems, blood clots or swollen lymph nodes  · Allergic/Immunologic: No nasal congestion or hives    Objective:      Physical Exam:  /72   Pulse 68   Ht 6' 2\" (1.88 m)   Wt 266 lb (120.7 kg)   BMI 34.15 kg/m²   Wt Readings from Last 3 Encounters:   11/30/21 266 lb (120.7 kg)   05/13/21 264 lb 12.8 oz (120.1 kg)   05/05/20 263 lb (119.3 kg)     Body mass index is 34.15 kg/m². Vitals:    11/30/21 0808   BP: 116/72   Pulse: 68          General Appearance:  No distress, conversant  Constitutional:  Well developed, Well nourished, No acute distress, Non-toxic appearance. HENT:  Normocephalic, Atraumatic, Bilateral external ears normal, Oropharynx moist, No oral exudates, Nose normal. Neck- Normal range of motion, No tenderness, Supple, No stridor,no apical-carotid delay  Eyes:  PERRL, EOMI, Conjunctiva normal, No discharge. Respiratory:  Normal breath sounds, No respiratory distress, No wheezing, No chest tenderness. ,no use of accessory muscles, NO crackles  Cardiovascular: (PMI) apex non displaced,no lifts no thrills,S1 and S2 audible, No added heart sounds, No signs of ankle edema, or volume overload, No evidence of JVD, No crackles  GI:  Bowel sounds normal, Soft, No tenderness, No masses, No gross visceromegaly   :  No costovertebral angle tenderness   Musculoskeletal:  No edema, no tenderness, no deformities.  Back- no tenderness  Integument:  Well hydrated, no rash   Lymphatic:  No lymphadenopathy noted   Neurologic:  Alert & oriented x 3, CN 2-12 normal, normal motor function, normal sensory function, no focal deficits noted   Psychiatric:  Speech and behavior appropriate           Medical decision making and Data review:  DATA:  Lab Results   Component Value Date    TROPONINT 0.078 (H) 02/03/2019     BNP:    Lab Results   Component Value Date    PROBNP 230.1 02/02/2019     PT/INR:  No results found for: PTINR  No results found for: LABA1C  Lab Results   Component Value Date    CHOL 148 12/12/2020    TRIG 153 (H) 12/12/2020    HDL 29 (L) 12/12/2020    LDLDIRECT 99 12/12/2020     Lab Results   Component Value Date    ALT 15 02/02/2019    AST 16 02/02/2019     TSH: No results found for: TSH  Lab Results   Component Value Date    AST 16 02/02/2019    ALT 15 02/02/2019    BILITOT 0.2 02/02/2019    ALKPHOS 75 02/02/2019     Lab Results   Component Value Date    PROBNP 230.1 02/02/2019     No results found for: LABA1C  Lab Results   Component Value Date    WBC 10.5 02/04/2019    HGB 13.2 (L) 02/04/2019    HCT 39.6 (L) 02/04/2019     02/04/2019     Cath 2/2/19   Procedure Summary   Indication: NSTEMI   Access: radial   1. 90 % stenosis of mid RCA and distal RCA has 90 % lesion ,   ostial PDA had 90 % lesion   2. s/p PCI to MID and Distal RCA , PCi to Ostial PDA using   overlapping stent extending from distal RCA to ostial PDA using   2.5 X 20 STEVE   3. 2.75 X 23 and 3.0 X 23 overlapping stents to proximal and mid   RCA reduced 90% lesion to 0 % lesion   4. LAD has minor irregularity   5. Circ is patent   6. LVEDP was 13 mmHG    Echo 2/4/19  Summary   Left ventricular systolic function is normal.   Ejection fraction is visually estimated at 55-60%.   Left ventricle size is normal.   No regional wall motion abnormalities were detected.   Mildly dilated left atrium.   No evidence of any pericardial effusion.   Frequent Pvc noted     Stress test 2/19/19  Conclusions      Summary   Appropriate hemodynamic response to exercise. No significant ST T wave   changes with exercise.  EKG portion is negative for ischemia by diagnostic   criteria. But Significant PVC burden which reduce with exercise      The patient exercised according to the [facility] for 8:59 min:s, achieving a   work level of Max. METS: 10.10. The resting heart rate of 83 bpm akil to a   maximal heart rate of 134 bpm. This value represents 80 % of the maximal,   age-predicted heart rate. The resting blood pressure of 142/70 mmHg , akil   to a maximum blood pressure of 168/88 mmHg.         All labs, medications and tests reviewed by myself including data and history from outside source , patient and available family . Assessment & Plan:      1. Mixed hyperlipidemia    2. Primary hypertension    3. ASCVD (arteriosclerotic cardiovascular disease)    4. Tobacco abuse    5. H/O non-ST elevation myocardial infarction (NSTEMI)         ASCVD (arteriosclerotic cardiovascular disease)  S/p PCI to RCA for NSTEMI in Feb 2019. continue aspirin 81 mg , continue statins. . He refused to go to Rehab after his MI , He stopped imdur due to \"feeling funny\"he is not exercising anymore , stress test in Dec 2019 showed no ischemia. Unfortunately not very compliant with diet and smoking again  Will stress again      Fatigue  He really thinks his  Low energy is due to low testosterone level . He understands he is at  increased cardiac events with  testosterone replacement     Tobacco abuse  He is smoking again   He was encouraged not to smoke anymore     Dyslipidemia :  All available lab work was reviewed. Patient was advised to repeat lab work before next visit  He has high triglyceride levels and very high LDL. He has been intolerant of statins in the past.  For now I will continue to use Lipitor 40 mg daily. Check lab work      Counseled extensively and medication compliance urged. We discussed that for the  prevention of ASCVD our  goal is aggressive risk modification. Patient is encouraged to exercise even a brisk walk for 30 minutes  at least 3 to 4 times a week   Various goals were discussed and questions answered. Continue current medications. Appropriate prescriptions are addressed and refills ordered. Questions answered and patient verbalizes understanding. Call for any problems, questions, or concerns. Continue all other medications of all above medical condition listed as is. Return in about 6 months (around 5/30/2022). Please note this report has been partially produced using speech recognition software and may contain errors related to that system including errors in grammar, punctuation, and spelling, as well as words and phrases that may be inappropriate. If there are any questions or concerns please feel free to contact the dictating provider for clarification.     An  electronic signature was used to authenticate this note.     --Jesi Shane MD on 5/5/2020 at 12:48 PM    [id number]

## 2021-12-08 RX ORDER — OMEPRAZOLE 20 MG/1
20 CAPSULE, DELAYED RELEASE ORAL 2 TIMES DAILY
Qty: 180 CAPSULE | Refills: 3 | Status: SHIPPED | OUTPATIENT
Start: 2021-12-08 | End: 2021-12-09 | Stop reason: SDUPTHER

## 2021-12-09 RX ORDER — OMEPRAZOLE 20 MG/1
20 CAPSULE, DELAYED RELEASE ORAL 2 TIMES DAILY
Qty: 180 CAPSULE | Refills: 3 | Status: SHIPPED | OUTPATIENT
Start: 2021-12-09 | End: 2022-06-01 | Stop reason: SDUPTHER

## 2021-12-27 ENCOUNTER — HOSPITAL ENCOUNTER (OUTPATIENT)
Age: 56
Discharge: HOME | End: 2021-12-27
Payer: COMMERCIAL

## 2021-12-27 DIAGNOSIS — I25.2 H/O NON-ST ELEVATION MYOCARDIAL INFARCTION (NSTEMI): ICD-10-CM

## 2021-12-27 DIAGNOSIS — E78.2 MIXED HYPERLIPIDEMIA: ICD-10-CM

## 2021-12-27 DIAGNOSIS — Z72.0 TOBACCO ABUSE: ICD-10-CM

## 2021-12-27 DIAGNOSIS — I10 PRIMARY HYPERTENSION: ICD-10-CM

## 2021-12-27 DIAGNOSIS — I25.10 ASCVD (ARTERIOSCLEROTIC CARDIOVASCULAR DISEASE): ICD-10-CM

## 2021-12-27 LAB
CHOLESTEROL: 190 MG/DL
HDLC SERPL-MCNC: 35 MG/DL
LDL CHOLESTEROL DIRECT: 117 MG/DL
TRIGL SERPL-MCNC: 217 MG/DL

## 2021-12-27 PROCEDURE — 83721 ASSAY OF BLOOD LIPOPROTEIN: CPT

## 2021-12-27 PROCEDURE — 80061 LIPID PANEL: CPT

## 2021-12-27 PROCEDURE — 36415 COLL VENOUS BLD VENIPUNCTURE: CPT

## 2021-12-28 ENCOUNTER — TELEPHONE (OUTPATIENT)
Dept: CARDIOLOGY CLINIC | Age: 56
End: 2021-12-28

## 2021-12-28 NOTE — TELEPHONE ENCOUNTER
----- Message from DREW Grayson CNP sent at 12/27/2021  3:07 PM EST -----  LDL has actually increased. Would he be willing to add zetia?  ----- Message -----  From: [facility] Incoming Lab Results From [employer] (Lexus Lee)  Sent: 12/27/2021   1:07 PM EST  To: Janny Juarez MD LM for pt to call back 12/28@ 8:23am   Pt called back, stated he wants to learn more about Zetia before taking it.

## 2021-12-30 ENCOUNTER — PROCEDURE VISIT (OUTPATIENT)
Dept: CARDIOLOGY CLINIC | Age: 56
End: 2021-12-30
Payer: COMMERCIAL

## 2021-12-30 DIAGNOSIS — E78.2 MIXED HYPERLIPIDEMIA: ICD-10-CM

## 2021-12-30 DIAGNOSIS — Z72.0 TOBACCO ABUSE: ICD-10-CM

## 2021-12-30 DIAGNOSIS — I10 PRIMARY HYPERTENSION: ICD-10-CM

## 2021-12-30 DIAGNOSIS — I25.2 H/O NON-ST ELEVATION MYOCARDIAL INFARCTION (NSTEMI): ICD-10-CM

## 2021-12-30 DIAGNOSIS — I25.10 ASCVD (ARTERIOSCLEROTIC CARDIOVASCULAR DISEASE): ICD-10-CM

## 2021-12-30 LAB
LV EF: 49 %
LVEF MODALITY: NORMAL

## 2021-12-30 PROCEDURE — A9500 TC99M SESTAMIBI: HCPCS | Performed by: INTERNAL MEDICINE

## 2021-12-30 PROCEDURE — 78452 HT MUSCLE IMAGE SPECT MULT: CPT | Performed by: INTERNAL MEDICINE

## 2021-12-30 PROCEDURE — 93015 CV STRESS TEST SUPVJ I&R: CPT | Performed by: INTERNAL MEDICINE

## 2022-03-08 RX ORDER — ATORVASTATIN CALCIUM 40 MG/1
40 TABLET, FILM COATED ORAL NIGHTLY
Qty: 90 TABLET | Refills: 3 | Status: SHIPPED | OUTPATIENT
Start: 2022-03-08 | End: 2022-06-01 | Stop reason: SDUPTHER

## 2022-03-08 RX ORDER — METOPROLOL TARTRATE 100 MG/1
100 TABLET ORAL 2 TIMES DAILY
Qty: 180 TABLET | Refills: 3 | Status: SHIPPED | OUTPATIENT
Start: 2022-03-08 | End: 2022-06-01 | Stop reason: SDUPTHER

## 2022-06-01 ENCOUNTER — OFFICE VISIT (OUTPATIENT)
Dept: CARDIOLOGY CLINIC | Age: 57
End: 2022-06-01
Payer: COMMERCIAL

## 2022-06-01 VITALS
WEIGHT: 264.8 LBS | SYSTOLIC BLOOD PRESSURE: 132 MMHG | HEIGHT: 74 IN | HEART RATE: 62 BPM | BODY MASS INDEX: 33.98 KG/M2 | DIASTOLIC BLOOD PRESSURE: 70 MMHG

## 2022-06-01 DIAGNOSIS — I25.10 ASCVD (ARTERIOSCLEROTIC CARDIOVASCULAR DISEASE): Primary | ICD-10-CM

## 2022-06-01 DIAGNOSIS — I10 PRIMARY HYPERTENSION: ICD-10-CM

## 2022-06-01 DIAGNOSIS — Z72.0 TOBACCO ABUSE: ICD-10-CM

## 2022-06-01 DIAGNOSIS — E78.2 MIXED HYPERLIPIDEMIA: ICD-10-CM

## 2022-06-01 PROCEDURE — 93000 ELECTROCARDIOGRAM COMPLETE: CPT | Performed by: NURSE PRACTITIONER

## 2022-06-01 PROCEDURE — 99215 OFFICE O/P EST HI 40 MIN: CPT | Performed by: NURSE PRACTITIONER

## 2022-06-01 RX ORDER — METOPROLOL TARTRATE 100 MG/1
100 TABLET ORAL 2 TIMES DAILY
Qty: 180 TABLET | Refills: 3 | Status: SHIPPED | OUTPATIENT
Start: 2022-06-01

## 2022-06-01 RX ORDER — ATORVASTATIN CALCIUM 40 MG/1
40 TABLET, FILM COATED ORAL NIGHTLY
Qty: 90 TABLET | Refills: 3 | Status: SHIPPED | OUTPATIENT
Start: 2022-06-01

## 2022-06-01 RX ORDER — OMEPRAZOLE 20 MG/1
20 CAPSULE, DELAYED RELEASE ORAL 2 TIMES DAILY
Qty: 180 CAPSULE | Refills: 3 | Status: SHIPPED | OUTPATIENT
Start: 2022-06-01

## 2022-06-01 ASSESSMENT — ENCOUNTER SYMPTOMS
SHORTNESS OF BREATH: 0
COUGH: 0

## 2022-06-01 NOTE — PROGRESS NOTES
[address] [facility],Third [address]  Phone: [phone]    Fax [phone]    Reba Wilder MD, Kari Medina MD, [address], MD, MD Dorothy Isabel MD Tyrone Ike, MD Glennis Murdoch, MD Paulino Edge, DREW Gongora, DREW Rubio, DREW Hilario, DREW Robbins, [place]     CARDIOLOGY  NOTE    2022    Kaden Finney (:  1965) is a 64 y.o. [facility] established patient with Dr. Skyler Vera, here for evaluation of the following chief complaint(s):  6 Month Follow-Up (Pt denies cardiac symptoms) and Results        SUBJECTIVE/OBJECTIVE:    Christofer Moyer is a 54y.o. year old who has history as noted below. Christofer Moyer states that he is feeling pretty good. He is not havnig any more  Weird feeling in his chest since stress test.   HE had COVID 2021. He continues to feel like he only has 60-70 % taste. Christofer Moyer has history of NSTEMI pci to RCA in 2019.( had pressure behind both shoulders and felt like walking in a fog as presentation)  He had normal stress test in Dec 2019. He is still smokes half a pack of smoke daily. He did take testosterone injections and supplements after which his dystonia levels have improved but did not notice any significant difference in energy level except for gaining 14 pounds        Review of Systems   Constitutional: Negative for fatigue and fever. Respiratory: Negative for cough and shortness of breath. Cardiovascular: Negative for chest pain, palpitations and leg swelling. Musculoskeletal: Negative for arthralgias and gait problem. Neurological: Negative for dizziness, syncope, weakness, light-headedness and headaches.        Vitals:    22 0948   Weight: 264 lb 12.8 oz (120.1 kg)   Height: 6' 2\" (1.88 m)       Wt Readings from Last 3 Encounters:   22 264 lb 12.8 oz (120.1 kg)   21 266 lb (120.7 kg)   21 264 lb 12.8 oz (120.1 kg)       BP Readings from Last 3 Encounters:   11/30/21 116/72   05/13/21 106/70   11/05/19 114/82       Prior to Admission medications    Medication Sig Start Date End Date Taking? Authorizing Provider   atorvastatin (LIPITOR) 40 MG tablet Take 1 tablet by mouth nightly 3/8/22  Yes Paulo Cortes MD   metoprolol (LOPRESSOR) 100 MG tablet Take 1 tablet by mouth 2 times daily 3/8/22  Yes Paulo Cortes MD   omeprazole (PRILOSEC) 20 MG delayed release capsule Take 1 capsule by mouth 2 times daily 12/9/21  Yes Paulo Cortes MD   Coenzyme Q10 (COQ10) 200 MG CAPS Take by mouth 2 times daily   Yes Historical Provider, MD   Omega-3 Fatty Acids (FISH OIL) 1000 MG CAPS Take 3,000 mg by mouth 2 times daily   Yes Historical Provider, MD   Cholecalciferol (VITAMIN D3) 50 MCG (2000 UT) CAPS Take by mouth   Yes Historical Provider, MD   zinc gluconate 50 MG tablet Take 50 mg by mouth daily   Yes Historical Provider, MD   Vitamin E 400 units TABS Take by mouth daily   Yes Historical Provider, MD   Carboxymethylcellulose Sodium (EYE DROPS OP) Apply to eye daily   Yes Historical Provider, MD   aspirin 81 MG chewable tablet Take 1 tablet by mouth daily Enteric formulation  Patient taking differently: Take 81 mg by mouth every morning Enteric formulation 2/5/19  Yes Jose Miguel Contreras MD   nitroGLYCERIN (NITROSTAT) 0.4 MG SL tablet up to max of 3 total doses. If no relief after 1 dose, call 911. 2/4/19  Yes Jose Miguel Contreras MD   loratadine (CLARITIN) 10 MG tablet Take 5 mg by mouth every morning    Yes Historical Provider, MD       Physical Exam  Vitals reviewed. Constitutional:       General: He is not in acute distress. Appearance: Normal appearance. He is obese. He is not ill-appearing. HENT:      Head: Atraumatic. Neck:      Vascular: No carotid bruit. Cardiovascular:      Rate and Rhythm: Normal rate and regular rhythm. Pulses: Normal pulses. Heart sounds: Normal heart sounds.  No murmur heard.      Pulmonary:      Effort: Pulmonary effort is normal. No respiratory distress. Breath sounds: Normal breath sounds. Musculoskeletal:         General: No swelling or deformity. Cervical back: Neck supple. No muscular tenderness. Neurological:      Mental Status: He is alert. Health Maintenance   Topic Date Due    COVID-19 Vaccine (1) Never done    Pneumococcal 0-64 years Vaccine (1 - PCV) Never done    Depression Screen  Never done    HIV screen  Never done    Hepatitis C screen  Never done    DTaP/Tdap/Td vaccine (1 - Tdap) Never done    Diabetes screen  Never done    Shingles vaccine (1 of 2) Never done    Flu vaccine (Season Ended) 09/01/2022    Prostate Specific Antigen (PSA) Screening or Monitoring  11/23/2022    Lipids  12/27/2022    Colorectal Cancer Screen  11/01/2029    Hepatitis A vaccine  Aged Out    Hepatitis B vaccine  Aged Out    Hib vaccine  Aged Out    Meningococcal (ACWY) vaccine  Aged Out       Lab Review   Lab Results   Component Value Date    CHOL 190 12/27/2021    TRIG 217 12/27/2021    HDL 35 12/27/2021    LDLDIRECT 117 12/27/2021        Cath 2/2/19   Procedure Summary   Indication: NSTEMI   Access: radial   1. 90 % stenosis of mid RCA and distal RCA has 90 % lesion ,   ostial PDA had 90 % lesion   2. s/p PCI to MID and Distal RCA , PCi to Ostial PDA using   overlapping stent extending from distal RCA to ostial PDA using   2.5 X 20 STEVE   3. 2.75 X 23 and 3.0 X 23 overlapping stents to proximal and mid   RCA reduced 90% lesion to 0 % lesion   4. LAD has minor irregularity   5. Circ is patent   6.  LVEDP was 13 mmHG     Echo 2/4/19  Summary   Left ventricular systolic function is normal.   Ejection fraction is visually estimated at 55-60%.   Left ventricle size is normal.   No regional wall motion abnormalities were detected.   Mildly dilated left atrium.   No evidence of any pericardial effusion.   Frequent Pvc noted     Stress test 2/19/19  Conclusions      Summary   Appropriate hemodynamic response to exercise. No significant ST T wave   changes with exercise. EKG portion is negative for ischemia by diagnostic   criteria. But Significant PVC burden which reduce with exercise      The patient exercised according to the [facility] for 8:59 min:s, achieving a   work level of Max. METS: 10.10. The resting heart rate of 83 bpm akil to a   maximal heart rate of 134 bpm. This value represents 80 % of the maximal,   age-predicted heart rate. The resting blood pressure of 142/70 mmHg , akil   to a maximum blood pressure of 168/88 mmHg.     12/30/2021 roberth stress  Summary   Normal Study.   Stress test changed after walking 9mins 41seconds from exercise to [facility] [facility] to patient unable to achieve THR. .      Excellent exercise capacity. 12 METs work load.   Physiological BP response to exercise.   ETT non diagnostic as THR is not achieved.   Normal perfusion study with normal distribution in all coronal, short, and   horizontal axis.   The observed defect is consistent with diaphragmatic attenuation.   Low normal LV function. LVEF is 49 %.   Supervising physician Dr. Homero Dawson . ASSESSMENT/PLAN:    ASCVD (arteriosclerotic cardiovascular disease)  S/p PCI to RCA for NSTEMI in Feb 2019. continue aspirin 81 mg, continue statins. He refused to go to Rehab after his MI, He stopped imdur due to \"feeling funny\" he is not exercising anymore, stress test in Dec 2019 and 12/2021 showed no ischemia. Reviewed secondary prevention and ways to reduce risk.      Fatigue  He really thinks his  Low energy is due to low testosterone level . He understands he is at  increased cardiac events with  testosterone replacement     Tobacco abuse  Reviewed nicotine effect on heart. Encouraged to stop smoking.       Dyslipidemia   All available lab work was reviewed. Patient was advised to repeat lab work before next visit  He has high triglyceride levels and very high LDL. He has been intolerant of statins in the past.  For now I will continue to use Lipitor 40 mg daily. He reports that he occasionally has muscle aches. Recommend starting zetia or repatha. He is very high risk for MACE. He would like to start exercising change diet and see labs in 5 months and see numbers. If high at that time will start medications at that time. Counseled extensively and medication compliance urged. We discussed that for the  prevention of ASCVD our  goal is aggressive risk modification. Patient is encouraged to exercise even a brisk walk for 30 minutes  at least 3 to 4 times a week         Various goals were discussed and questions answered. Continue current medications. Appropriate prescriptions are addressed and refills ordered. Questions answered and patient verbalizes understanding. Call for any problems, questions, or concerns. Return in about 6 months (around 12/1/2022). An electronic signature was used to authenticate this note.     Electronically signed by DREW Marina CNP on 6/1/2022 at 9:50 AM

## 2022-06-01 NOTE — PATIENT INSTRUCTIONS
Please be informed that if you contact our office outside of normal business hours the physician on call cannot help with any scheduling or rescheduling issues, procedure instruction questions or any type of medication issue. We advise you for any urgent/emergency that you go to the nearest emergency room! PLEASE CALL OUR OFFICE DURING NORMAL BUSINESS HOURS    Monday - Friday   8 am to 5 pm    [place] [address]: [phone]    [place]:  [phone]      **It is YOUR responsibilty to bring medication bottles and/or updated medication list to [address] [place] [address]. This will allow us to better serve you and all your healthcare needs**    [facility] Laboratory Locations - No appointment necessary. Sites open Monday to Friday. Call your preferred location for test preparation, business hours and other information you need. [employer] accepts [employer]. [facility] [address], [address] [facility]  Phone: [phone] [facility] [address] [facility]  [address].   [place] giles, [address]  Phone: [phone]

## 2022-12-06 ENCOUNTER — OFFICE VISIT (OUTPATIENT)
Dept: CARDIOLOGY CLINIC | Age: 57
End: 2022-12-06
Payer: COMMERCIAL

## 2022-12-06 VITALS
WEIGHT: 263.8 LBS | BODY MASS INDEX: 33.86 KG/M2 | SYSTOLIC BLOOD PRESSURE: 132 MMHG | OXYGEN SATURATION: 96 % | DIASTOLIC BLOOD PRESSURE: 72 MMHG | HEART RATE: 69 BPM | HEIGHT: 74 IN

## 2022-12-06 DIAGNOSIS — I25.10 ASCVD (ARTERIOSCLEROTIC CARDIOVASCULAR DISEASE): Primary | ICD-10-CM

## 2022-12-06 DIAGNOSIS — I10 PRIMARY HYPERTENSION: ICD-10-CM

## 2022-12-06 DIAGNOSIS — E78.2 MIXED HYPERLIPIDEMIA: ICD-10-CM

## 2022-12-06 DIAGNOSIS — Z72.0 TOBACCO ABUSE: ICD-10-CM

## 2022-12-06 DIAGNOSIS — I25.2 H/O NON-ST ELEVATION MYOCARDIAL INFARCTION (NSTEMI): ICD-10-CM

## 2022-12-06 PROCEDURE — G8427 DOCREV CUR MEDS BY ELIG CLIN: HCPCS | Performed by: INTERNAL MEDICINE

## 2022-12-06 PROCEDURE — 4004F PT TOBACCO SCREEN RCVD TLK: CPT | Performed by: INTERNAL MEDICINE

## 2022-12-06 PROCEDURE — 3074F SYST BP LT 130 MM HG: CPT | Performed by: INTERNAL MEDICINE

## 2022-12-06 PROCEDURE — 99214 OFFICE O/P EST MOD 30 MIN: CPT | Performed by: INTERNAL MEDICINE

## 2022-12-06 PROCEDURE — G8484 FLU IMMUNIZE NO ADMIN: HCPCS | Performed by: INTERNAL MEDICINE

## 2022-12-06 PROCEDURE — 3017F COLORECTAL CA SCREEN DOC REV: CPT | Performed by: INTERNAL MEDICINE

## 2022-12-06 PROCEDURE — G8417 CALC BMI ABV UP PARAM F/U: HCPCS | Performed by: INTERNAL MEDICINE

## 2022-12-06 PROCEDURE — 3078F DIAST BP <80 MM HG: CPT | Performed by: INTERNAL MEDICINE

## 2022-12-06 NOTE — PROGRESS NOTES
Enteric formulation) 90 tablet 3    loratadine (CLARITIN) 10 MG tablet Take 5 mg by mouth every morning       nitroGLYCERIN (NITROSTAT) 0.4 MG SL tablet up to max of 3 total doses. If no relief after 1 dose, call 911. 30 tablet 0     No current facility-administered medications for this visit. Allergies:   Patient has no known allergies. Patient History:  Past Medical History:   Diagnosis Date    Arthritis     CAD (coronary artery disease)     Elevated prostate specific antigen (PSA) 03/01/2021    H/O cardiovascular stress test 02/19/2019    Significant PVC burdon which reduced with exercise    H/O echocardiogram 02/04/2019    55-60%  Frequest PVCs    History of nuclear stress test 12/13/2019    cardiolite- normal    Hyperlipidemia     Hypertension     NSTEMI (non-ST elevated myocardial infarction) ([facility] [address] 02/02/2019    NSTEMI (non-ST elevated myocardial infarction) ([facility] [address] 2/2/2019    S/P PTCA (percutaneous transluminal coronary angioplasty) 02/02/2019    X 3 stent RCA mid and Distal, PDA     Past Surgical History:   Procedure Laterality Date    CARDIAC SURGERY  02/03/2019    heart cath with three stents    COLONOSCOPY  11/01/2019    colon polyps, diverticulosis coli    COLONOSCOPY N/A 11/1/2019    COLONOSCOPY POLYPECTOMY SNARE/COLD BIOPSY performed by Bryn Adler MD at [address] [place]     No family history on file. Social History     Tobacco Use    Smoking status: Every Day     Packs/day: 0.50     Types: Cigarettes     Last attempt to quit: 2/2/2019     Years since quitting: 3.8    Smokeless tobacco: Never    Tobacco comments:     6 cigarettes    Substance Use Topics    Alcohol use: Yes     Comment: occasionally.  caffeine 1 pot of coffee a day        Review of Systems:   Constitutional: No Fever or Weight Loss   Eyes: No Decreased Vision  ENT: No Headaches, Hearing Loss or Vertigo  Cardiovascular: as per note above   Respiratory: No cough or wheezing and as per note above. Gastrointestinal: No abdominal pain, appetite loss, blood in stools, constipation, diarrhea or heartburn  Genitourinary: No dysuria, trouble voiding, or hematuria  Musculoskeletal:  None  Integumentary: No rash or pruritis  Neurological: No TIA or stroke symptoms  Psychiatric: No anxiety or depression  Endocrine: No malaise, fatigue or temperature intolerance  Hematologic/Lymphatic: No bleeding problems, blood clots or swollen lymph nodes  Allergic/Immunologic: No nasal congestion or hives    Objective:      Physical Exam:  /72 (Site: Left Upper Arm, Position: Sitting, Cuff Size: Large Adult)   Pulse 69   Ht 6' 2\" (1.88 m)   Wt 263 lb 12.8 oz (119.7 kg)   SpO2 96%   BMI 33.87 kg/m²   Wt Readings from Last 3 Encounters:   12/06/22 263 lb 12.8 oz (119.7 kg)   06/01/22 264 lb 12.8 oz (120.1 kg)   11/30/21 266 lb (120.7 kg)     Body mass index is 33.87 kg/m². Vitals:    12/06/22 0813   BP: 132/72   Pulse: 69   SpO2: 96%          General Appearance:  No distress, conversant  Constitutional:  Well developed, Well nourished, No acute distress, Non-toxic appearance. HENT:  Normocephalic, Atraumatic, Bilateral external ears normal, Oropharynx moist, No oral exudates, Nose normal. Neck- Normal range of motion, No tenderness, Supple, No stridor,no apical-carotid delay  Eyes:  PERRL, EOMI, Conjunctiva normal, No discharge. Respiratory:  Normal breath sounds, No respiratory distress, No wheezing, No chest tenderness. ,no use of accessory muscles, NO crackles  Cardiovascular: (PMI) apex non displaced,no lifts no thrills,S1 and S2 audible, No added heart sounds, No signs of ankle edema, or volume overload, No evidence of JVD, No crackles  GI:  Bowel sounds normal, Soft, No tenderness, No masses, No gross visceromegaly   :  No costovertebral angle tenderness   Musculoskeletal:  No edema, no tenderness, no deformities.  Back- no tenderness  Integument:  Well hydrated, no rash   Lymphatic:  No lymphadenopathy noted   Neurologic:  Alert & oriented x 3, CN 2-12 normal, normal motor function, normal sensory function, no focal deficits noted   Psychiatric:  Speech and behavior appropriate           Medical decision making and Data review:  DATA:  Lab Results   Component Value Date    TROPONINT 0.078 (H) 02/03/2019     BNP:    Lab Results   Component Value Date    PROBNP 230.1 02/02/2019     PT/INR:  No results found for: PTINR  No results found for: LABA1C  Lab Results   Component Value Date    CHOL 190 12/27/2021    TRIG 217 (H) 12/27/2021    HDL 35 (L) 12/27/2021    LDLDIRECT 117 (H) 12/27/2021     Lab Results   Component Value Date    ALT 15 02/02/2019    AST 16 02/02/2019     TSH: No results found for: TSH  Lab Results   Component Value Date    AST 16 02/02/2019    ALT 15 02/02/2019    BILITOT 0.2 02/02/2019    ALKPHOS 75 02/02/2019     Lab Results   Component Value Date    PROBNP 230.1 02/02/2019     No results found for: LABA1C  Lab Results   Component Value Date    WBC 10.5 02/04/2019    HGB 13.2 (L) 02/04/2019    HCT 39.6 (L) 02/04/2019     02/04/2019     Cath 2/2/19   Procedure Summary   Indication: NSTEMI   Access: radial   1. 90 % stenosis of mid RCA and distal RCA has 90 % lesion ,   ostial PDA had 90 % lesion   2. s/p PCI to MID and Distal RCA , PCi to Ostial PDA using   overlapping stent extending from distal RCA to ostial PDA using   2.5 X 20 STEVE   3. 2.75 X 23 and 3.0 X 23 overlapping stents to proximal and mid   RCA reduced 90% lesion to 0 % lesion   4. LAD has minor irregularity   5. Circ is patent   6. LVEDP was 13 mmHG    Echo 2/4/19  Summary   Left ventricular systolic function is normal.   Ejection fraction is visually estimated at 55-60%. Left ventricle size is normal.   No regional wall motion abnormalities were detected. Mildly dilated left atrium. No evidence of any pericardial effusion.    Frequent Pvc noted     Stress test 2/19/19  Conclusions      Summary   Appropriate hemodynamic response to exercise. No significant ST T wave   changes with exercise. EKG portion is negative for ischemia by diagnostic   criteria. But Significant PVC burden which reduce with exercise      The patient exercised according to the [facility] for 8:59 min:s, achieving a   work level of Max. METS: 10.10. The resting heart rate of 83 bpm akil to a   maximal heart rate of 134 bpm. This value represents 80 % of the maximal,   age-predicted heart rate. The resting blood pressure of 142/70 mmHg , akil   to a maximum blood pressure of 168/88 mmHg. All labs, medications and tests reviewed by myself including data and history from outside source , patient and available family . Assessment & Plan:      1. ASCVD (arteriosclerotic cardiovascular disease)    2. H/O non-ST elevation myocardial infarction (NSTEMI)    3. Mixed hyperlipidemia    4. Primary hypertension    5. Tobacco abuse           ASCVD (arteriosclerotic cardiovascular disease)  S/p PCI to RCA for NSTEMI in Feb 2019. continue aspirin 81 mg , continue statins. . He stopped imdur due to \"feeling funny\"he is not exercising anymore , stress test in Dec 2019 showed no ischemia. Unfortunately not very compliant with diet and smoking again  Did stress test 1/1/2022 Completed 12 METS but did  not reach target   Refer to V2p Study     Fatigue  He really thinks his  Low energy is due to low testosterone level . He understands he is at  increased cardiac events with  testosterone replacement     Tobacco abuse  He is smoking again   He was encouraged not to smoke anymore     Dyslipidemia :  All available lab work was reviewed. Patient was advised to repeat lab work before next visit  He has high triglyceride levels and very high LDL. He has been intolerant of statins in the past.  For now I will continue to use Lipitor 40 mg daily. Check lab work  Refer to [facility] [place] extensively and medication compliance urged.   We discussed that for the prevention of ASCVD our  goal is aggressive risk modification. Patient is encouraged to exercise even a brisk walk for 30 minutes  at least 3 to 4 times a week   Various goals were discussed and questions answered. Continue current medications. Appropriate prescriptions are addressed and refills ordered. Questions answered and patient verbalizes understanding. Call for any problems, questions, or concerns. Continue all other medications of all above medical condition listed as is. Return in about 6 months (around 6/6/2023). Please note this report has been partially produced using speech recognition software and may contain errors related to that system including errors in grammar, punctuation, and spelling, as well as words and phrases that may be inappropriate. If there are any questions or concerns please feel free to contact the dictating provider for clarification. An  electronic signature was used to authenticate this note.     --Donovan Humphrey MD on 5/5/2020 at 12:48 PM    [id number]

## 2022-12-06 NOTE — PATIENT INSTRUCTIONS
Please be informed that if you contact our office outside of normal business hours the physician on call cannot help with any scheduling or rescheduling issues, procedure instruction questions or any type of medication issue. We advise you for any urgent/emergency that you go to the nearest emergency room! PLEASE CALL OUR OFFICE DURING NORMAL BUSINESS HOURS    Monday - Friday   8 am to 5 pm    [place] [address]: [phone]    [place]:  [phone]      **It is YOUR responsibilty to bring medication bottles and/or updated medication list to [address] [place] [address]. This will allow us to better serve you and all your healthcare needs**    Thank you for allowing us to care for you today! We want to ensure we can follow your treatment plan and we strive to give you the best outcomes and experience possible. If you ever have a life threatening emergency and call 911 - for an ambulance (EMS)   Our providers can only care for you at:   [facility] or [facility]. Even if you have someone take you or you drive yourself we can only care for you in a [facility] facility. Our providers are not setup at the other healthcare locations!

## 2022-12-07 ENCOUNTER — HOSPITAL ENCOUNTER (OUTPATIENT)
Age: 57
Discharge: HOME | End: 2022-12-07
Payer: COMMERCIAL

## 2022-12-07 DIAGNOSIS — E78.2 MIXED HYPERLIPIDEMIA: ICD-10-CM

## 2022-12-07 DIAGNOSIS — I25.2 H/O NON-ST ELEVATION MYOCARDIAL INFARCTION (NSTEMI): ICD-10-CM

## 2022-12-07 DIAGNOSIS — Z72.0 TOBACCO ABUSE: ICD-10-CM

## 2022-12-07 DIAGNOSIS — I25.10 ASCVD (ARTERIOSCLEROTIC CARDIOVASCULAR DISEASE): ICD-10-CM

## 2022-12-07 DIAGNOSIS — I10 PRIMARY HYPERTENSION: ICD-10-CM

## 2022-12-07 LAB
ALBUMIN SERPL-MCNC: 4.3 GM/DL (ref 3.4–5)
ALP BLD-CCNC: 84 IU/L (ref 40–128)
ALT SERPL-CCNC: 16 U/L (ref 10–40)
ANION GAP SERPL CALCULATED.3IONS-SCNC: 11 MMOL/L (ref 4–16)
AST SERPL-CCNC: 15 IU/L (ref 15–37)
BILIRUB SERPL-MCNC: 0.4 MG/DL (ref 0–1)
BUN BLDV-MCNC: 17 MG/DL (ref 6–23)
CALCIUM SERPL-MCNC: 9.1 MG/DL (ref 8.3–10.6)
CHLORIDE BLD-SCNC: 104 MMOL/L (ref 99–110)
CHOLESTEROL: 175 MG/DL
CO2: 24 MMOL/L (ref 21–32)
CREAT SERPL-MCNC: 1 MG/DL (ref 0.9–1.3)
GFR SERPL CREATININE-BSD FRML MDRD: >60 ML/MIN/1.73M2
GLUCOSE BLD-MCNC: 100 MG/DL (ref 70–99)
HDLC SERPL-MCNC: 35 MG/DL
LDL CHOLESTEROL CALCULATED: 108 MG/DL
POTASSIUM SERPL-SCNC: 4.6 MMOL/L (ref 3.5–5.1)
SODIUM BLD-SCNC: 139 MMOL/L (ref 135–145)
TOTAL PROTEIN: 6.4 GM/DL (ref 6.4–8.2)
TRIGL SERPL-MCNC: 161 MG/DL

## 2022-12-07 PROCEDURE — 80061 LIPID PANEL: CPT

## 2022-12-07 PROCEDURE — 36415 COLL VENOUS BLD VENIPUNCTURE: CPT

## 2022-12-07 PROCEDURE — 80053 COMPREHEN METABOLIC PANEL: CPT

## 2022-12-09 ENCOUNTER — TELEPHONE (OUTPATIENT)
Dept: CARDIOLOGY CLINIC | Age: 57
End: 2022-12-09

## 2023-02-06 ENCOUNTER — TELEPHONE (OUTPATIENT)
Dept: CARDIOLOGY CLINIC | Age: 58
End: 2023-02-06

## 2023-02-06 NOTE — TELEPHONE ENCOUNTER
Pts wife, Sonia calling for .  having similar symptoms as in 2019 when pt had HC with stents-SOB, foggy, chest heaviness, Fatigue. 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 Appt made for tues @ 630.

## 2023-02-07 ENCOUNTER — TELEPHONE (OUTPATIENT)
Dept: CARDIOLOGY CLINIC | Age: 58
End: 2023-02-07

## 2023-02-07 ENCOUNTER — OFFICE VISIT (OUTPATIENT)
Dept: CARDIOLOGY CLINIC | Age: 58
End: 2023-02-07
Payer: COMMERCIAL

## 2023-02-07 VITALS
HEIGHT: 74 IN | SYSTOLIC BLOOD PRESSURE: 132 MMHG | HEART RATE: 70 BPM | WEIGHT: 265.2 LBS | BODY MASS INDEX: 34.03 KG/M2 | DIASTOLIC BLOOD PRESSURE: 84 MMHG

## 2023-02-07 DIAGNOSIS — I25.2 H/O NON-ST ELEVATION MYOCARDIAL INFARCTION (NSTEMI): ICD-10-CM

## 2023-02-07 DIAGNOSIS — I10 PRIMARY HYPERTENSION: ICD-10-CM

## 2023-02-07 DIAGNOSIS — Z72.0 TOBACCO ABUSE: ICD-10-CM

## 2023-02-07 DIAGNOSIS — E78.2 MIXED HYPERLIPIDEMIA: ICD-10-CM

## 2023-02-07 DIAGNOSIS — I25.10 ASCVD (ARTERIOSCLEROTIC CARDIOVASCULAR DISEASE): Primary | ICD-10-CM

## 2023-02-07 PROCEDURE — 3017F COLORECTAL CA SCREEN DOC REV: CPT | Performed by: INTERNAL MEDICINE

## 2023-02-07 PROCEDURE — 4004F PT TOBACCO SCREEN RCVD TLK: CPT | Performed by: INTERNAL MEDICINE

## 2023-02-07 PROCEDURE — G8417 CALC BMI ABV UP PARAM F/U: HCPCS | Performed by: INTERNAL MEDICINE

## 2023-02-07 PROCEDURE — 99214 OFFICE O/P EST MOD 30 MIN: CPT | Performed by: INTERNAL MEDICINE

## 2023-02-07 PROCEDURE — 3079F DIAST BP 80-89 MM HG: CPT | Performed by: INTERNAL MEDICINE

## 2023-02-07 PROCEDURE — 93000 ELECTROCARDIOGRAM COMPLETE: CPT | Performed by: INTERNAL MEDICINE

## 2023-02-07 PROCEDURE — G8427 DOCREV CUR MEDS BY ELIG CLIN: HCPCS | Performed by: INTERNAL MEDICINE

## 2023-02-07 PROCEDURE — 3075F SYST BP GE 130 - 139MM HG: CPT | Performed by: INTERNAL MEDICINE

## 2023-02-07 PROCEDURE — G8484 FLU IMMUNIZE NO ADMIN: HCPCS | Performed by: INTERNAL MEDICINE

## 2023-02-07 RX ORDER — ATORVASTATIN CALCIUM 80 MG/1
80 TABLET, FILM COATED ORAL NIGHTLY
Qty: 90 TABLET | Refills: 4 | Status: SHIPPED | OUTPATIENT
Start: 2023-02-07

## 2023-02-07 NOTE — PROGRESS NOTES
CARDIOLOGY  NOTE                Chief Complaint: ASCVD    HPI:   Scott Bell is a 62y.o. year old who has history as noted below. Scott Bell is under a lot of stress due to wife being sick, work and his cousin  of heart attack   says he does not work regularly but tries to walk 5000 steps /HE is not complaint with diet  He  notices he gest winded when he walking from his parking to office   Scott Bell has history of NSTEMI pci to RCA in 2019.( had pressure behind both shoulders and felt like walking in a fog as presentation)  He says he is under stress and having some \" feeling of tunnel vision and body feels weird and chest feeling unwell\"    He had normal stress test in Dec 2019 and repeat  he did not reach target but completed 12 METS. He .still smokes half a pack of smoke daily   He gest feeling of chest feeling \"weird/ empty feeling  \" but no chest pain , occurs at rest he is worried about broken heart syndrome   He did take testosterone injections and supplements after which his dystonia levels have improved but did not notice any significant difference in energy level except for gaining 14 pounds  He feels that in the past he could not tolerate statins and lately has started noticing that he needs to stretch his muscles more?     He works for [employer]         Current Outpatient Medications   Medication Sig Dispense Refill    atorvastatin (LIPITOR) 80 MG tablet Take 1 tablet by mouth nightly 90 tablet 4    metoprolol (LOPRESSOR) 100 MG tablet Take 1 tablet by mouth 2 times daily 180 tablet 3    omeprazole (PRILOSEC) 20 MG delayed release capsule Take 1 capsule by mouth 2 times daily 180 capsule 3    Coenzyme Q10 (COQ10) 200 MG CAPS Take by mouth 2 times daily      Omega-3 Fatty Acids (FISH OIL) 1000 MG CAPS Take 3,000 mg by mouth 2 times daily      Cholecalciferol (VITAMIN D3) 50 MCG ( UT) CAPS Take by mouth      zinc gluconate 50 MG tablet Take 50 mg by mouth daily      Vitamin E 400 units TABS Take by mouth daily      Carboxymethylcellulose Sodium (EYE DROPS OP) Apply to eye daily      aspirin 81 MG chewable tablet Take 1 tablet by mouth daily Enteric formulation (Patient taking differently: Take 81 mg by mouth every morning Enteric formulation) 90 tablet 3    nitroGLYCERIN (NITROSTAT) 0.4 MG SL tablet up to max of 3 total doses. If no relief after 1 dose, call 911. 30 tablet 0    loratadine (CLARITIN) 10 MG tablet Take 5 mg by mouth every morning        No current facility-administered medications for this visit. Allergies:   Patient has no known allergies. Patient History:  Past Medical History:   Diagnosis Date    Arthritis     CAD (coronary artery disease)     Elevated prostate specific antigen (PSA) 2021    H/O cardiovascular stress test 2019    Significant PVC burdon which reduced with exercise    H/O echocardiogram 2019    55-60%  Frequest PVCs    History of nuclear stress test 2019    cardiolite- normal    Hyperlipidemia     Hypertension     NSTEMI (non-ST elevated myocardial infarction) ([facility] [address] 2019    NSTEMI (non-ST elevated myocardial infarction) ([address] 2019    S/P PTCA (percutaneous transluminal coronary angioplasty) 02/02/2019    X 3 stent RCA mid and Distal, PDA     Past Surgical History:   Procedure Laterality Date    CARDIAC SURGERY  2019    heart cath with three stents    COLONOSCOPY  2019    colon polyps, diverticulosis coli    COLONOSCOPY N/A 2019    COLONOSCOPY POLYPECTOMY SNARE/COLD BIOPSY performed by Yoandy Payton MD at [facility]  Right     inguinal     No family history on file. Social History     Tobacco Use    Smoking status: Every Day     Packs/day: 0.50     Types: Cigarettes     Last attempt to quit: 2019     Years since quittin.0    Smokeless tobacco: Never    Tobacco comments:     6 cigarettes    Substance Use Topics    Alcohol use:  Yes Comment: occasionally. caffeine 1 pot of coffee a day        Review of Systems:   Constitutional: No Fever or Weight Loss   Eyes: No Decreased Vision  ENT: No Headaches, Hearing Loss or Vertigo  Cardiovascular: as per note above   Respiratory: No cough or wheezing and as per note above. Gastrointestinal: No abdominal pain, appetite loss, blood in stools, constipation, diarrhea or heartburn  Genitourinary: No dysuria, trouble voiding, or hematuria  Musculoskeletal:  None  Integumentary: No rash or pruritis  Neurological: No TIA or stroke symptoms  Psychiatric: No anxiety or depression  Endocrine: No malaise, fatigue or temperature intolerance  Hematologic/Lymphatic: No bleeding problems, blood clots or swollen lymph nodes  Allergic/Immunologic: No nasal congestion or hives    Objective:      Physical Exam:  /84 (Site: Left Upper Arm, Position: Sitting, Cuff Size: Medium Adult)   Pulse 70   Ht 6' 2\" (1.88 m)   Wt 265 lb 3.2 oz (120.3 kg)   BMI 34.05 kg/m²   Wt Readings from Last 3 Encounters:   02/07/23 265 lb 3.2 oz (120.3 kg)   12/06/22 263 lb 12.8 oz (119.7 kg)   06/01/22 264 lb 12.8 oz (120.1 kg)     Body mass index is 34.05 kg/m². Vitals:    02/07/23 0850   BP: 132/84   Pulse: 70          General Appearance:  No distress, conversant  Constitutional:  Well developed, Well nourished, No acute distress, Non-toxic appearance. HENT:  Normocephalic, Atraumatic, Bilateral external ears normal, Oropharynx moist, No oral exudates, Nose normal. Neck- Normal range of motion, No tenderness, Supple, No stridor,no apical-carotid delay  Eyes:  PERRL, EOMI, Conjunctiva normal, No discharge. Respiratory:  Normal breath sounds, No respiratory distress, No wheezing, No chest tenderness. ,no use of accessory muscles, NO crackles  Cardiovascular: (PMI) apex non displaced,no lifts no thrills,S1 and S2 audible, No added heart sounds, No signs of ankle edema, or volume overload, No evidence of JVD, No crackles  GI: Bowel sounds normal, Soft, No tenderness, No masses, No gross visceromegaly   :  No costovertebral angle tenderness   Musculoskeletal:  No edema, no tenderness, no deformities. Back- no tenderness  Integument:  Well hydrated, no rash   Lymphatic:  No lymphadenopathy noted   Neurologic:  Alert & oriented x 3, CN 2-12 normal, normal motor function, normal sensory function, no focal deficits noted   Psychiatric:  Speech and behavior appropriate           Medical decision making and Data review:  DATA:  Lab Results   Component Value Date    TROPONINT 0.078 (H) 02/03/2019     BNP:    Lab Results   Component Value Date    PROBNP 230.1 02/02/2019     PT/INR:  No results found for: PTINR  No results found for: LABA1C  Lab Results   Component Value Date    CHOL 175 12/07/2022    TRIG 161 (H) 12/07/2022    HDL 35 (L) 12/07/2022    LDLCALC 108 (H) 12/07/2022    LDLDIRECT 117 (H) 12/27/2021     Lab Results   Component Value Date    ALT 16 12/07/2022    AST 15 12/07/2022     TSH: No results found for: TSH  Lab Results   Component Value Date    AST 15 12/07/2022    ALT 16 12/07/2022    BILITOT 0.4 12/07/2022    ALKPHOS 84 12/07/2022     Lab Results   Component Value Date    PROBNP 230.1 02/02/2019     No results found for: LABA1C  Lab Results   Component Value Date    WBC 10.5 02/04/2019    HGB 13.2 (L) 02/04/2019    HCT 39.6 (L) 02/04/2019     02/04/2019     Cath 2/2/19   Procedure Summary   Indication: NSTEMI   Access: radial   1. 90 % stenosis of mid RCA and distal RCA has 90 % lesion ,   ostial PDA had 90 % lesion   2. s/p PCI to MID and Distal RCA , PCi to Ostial PDA using   overlapping stent extending from distal RCA to ostial PDA using   2.5 X 20 STEVE   3. 2.75 X 23 and 3.0 X 23 overlapping stents to proximal and mid   RCA reduced 90% lesion to 0 % lesion   4. LAD has minor irregularity   5. Circ is patent   6.  LVEDP was 13 mmHG    Echo 2/4/19  Summary   Left ventricular systolic function is normal.   Ejection fraction is visually estimated at 55-60%. Left ventricle size is normal.   No regional wall motion abnormalities were detected. Mildly dilated left atrium. No evidence of any pericardial effusion. Frequent Pvc noted     Stress test 2/19/19  Conclusions      Summary   Appropriate hemodynamic response to exercise. No significant ST T wave   changes with exercise. EKG portion is negative for ischemia by diagnostic   criteria. But Significant PVC burden which reduce with exercise      The patient exercised according to the [facility] for 8:59 min:s, achieving a   work level of Max. METS: 10.10. The resting heart rate of 83 bpm akil to a   maximal heart rate of 134 bpm. This value represents 80 % of the maximal,   age-predicted heart rate. The resting blood pressure of 142/70 mmHg , akil   to a maximum blood pressure of 168/88 mmHg. All labs, medications and tests reviewed by myself including data and history from outside source , patient and available family . Assessment & Plan:      1. ASCVD (arteriosclerotic cardiovascular disease)    2. Primary hypertension    3. Mixed hyperlipidemia    4. H/O non-ST elevation myocardial infarction (NSTEMI)    5. Tobacco abuse           ASCVD (arteriosclerotic cardiovascular disease)  S/p PCI to RCA for NSTEMI in Feb 2019. continue aspirin 81 mg , continue statins. . He stopped imdur due to \"feeling funny\"he is not exercising anymore , stress test in Dec 2019 showed no ischemia. Unfortunately not very compliant with diet and smoking again  Did stress test 1/1/2022 Completed 12 METS but did  not reach target   Refer to V2p Study     Fatigue  He really thinks his  Low energy is due to low testosterone level . He understands he is at  increased cardiac events with  testosterone replacement     Tobacco abuse  He is smoking again   He was encouraged not to smoke anymore     Dyslipidemia :  All available lab work was reviewed.   Patient was advised to repeat lab work before next visit  He has high triglyceride levels and very high LDL. He has been intolerant of statins in the past .  For now I will continue to use Lipitor 40 mg daily. Check lab work  [facility] and medication compliance urged. We discussed that for the  prevention of ASCVD our  goal is aggressive risk modification. Patient is encouraged to exercise even a brisk walk for 30 minutes  at least 3 to 4 times a week   Various goals were discussed and questions answered. Continue current medications. Appropriate prescriptions are addressed and refills ordered. Questions answered and patient verbalizes understanding. Call for any problems, questions, or concerns. Continue all other medications of all above medical condition listed as is. Return in about 6 months (around 8/7/2023). Please note this report has been partially produced using speech recognition software and may contain errors related to that system including errors in grammar, punctuation, and spelling, as well as words and phrases that may be inappropriate. If there are any questions or concerns please feel free to contact the dictating provider for clarification. An  electronic signature was used to authenticate this note.     --Kandace Patino MD on 5/5/2020 at 12:48 PM    [id number]

## 2023-02-18 ENCOUNTER — PROCEDURE VISIT (OUTPATIENT)
Dept: CARDIOLOGY CLINIC | Age: 58
End: 2023-02-18

## 2023-02-18 DIAGNOSIS — I25.10 ASCVD (ARTERIOSCLEROTIC CARDIOVASCULAR DISEASE): ICD-10-CM

## 2023-02-18 DIAGNOSIS — I10 PRIMARY HYPERTENSION: ICD-10-CM

## 2023-02-18 DIAGNOSIS — R07.89 OTHER CHEST PAIN: Primary | ICD-10-CM

## 2023-02-18 DIAGNOSIS — I25.2 H/O NON-ST ELEVATION MYOCARDIAL INFARCTION (NSTEMI): ICD-10-CM

## 2023-02-18 DIAGNOSIS — Z72.0 TOBACCO ABUSE: ICD-10-CM

## 2023-02-18 DIAGNOSIS — E78.2 MIXED HYPERLIPIDEMIA: ICD-10-CM

## 2023-06-26 RX ORDER — OMEPRAZOLE 20 MG/1
20 CAPSULE, DELAYED RELEASE ORAL 2 TIMES DAILY
Qty: 180 CAPSULE | Refills: 3 | Status: SHIPPED | OUTPATIENT
Start: 2023-06-26

## 2023-06-26 RX ORDER — METOPROLOL TARTRATE 100 MG/1
100 TABLET ORAL 2 TIMES DAILY
Qty: 180 TABLET | Refills: 3 | Status: SHIPPED | OUTPATIENT
Start: 2023-06-26

## 2023-08-07 ENCOUNTER — HOSPITAL ENCOUNTER (OUTPATIENT)
Age: 58
Discharge: HOME | End: 2023-08-07
Payer: COMMERCIAL

## 2023-08-07 ENCOUNTER — OFFICE VISIT (OUTPATIENT)
Dept: CARDIOLOGY CLINIC | Age: 58
End: 2023-08-07
Payer: COMMERCIAL

## 2023-08-07 VITALS
DIASTOLIC BLOOD PRESSURE: 84 MMHG | HEART RATE: 90 BPM | HEIGHT: 74 IN | BODY MASS INDEX: 32.73 KG/M2 | WEIGHT: 255 LBS | SYSTOLIC BLOOD PRESSURE: 132 MMHG

## 2023-08-07 DIAGNOSIS — I10 PRIMARY HYPERTENSION: ICD-10-CM

## 2023-08-07 DIAGNOSIS — I25.2 H/O NON-ST ELEVATION MYOCARDIAL INFARCTION (NSTEMI): ICD-10-CM

## 2023-08-07 DIAGNOSIS — E78.2 MIXED HYPERLIPIDEMIA: ICD-10-CM

## 2023-08-07 DIAGNOSIS — I25.10 ASCVD (ARTERIOSCLEROTIC CARDIOVASCULAR DISEASE): Primary | ICD-10-CM

## 2023-08-07 DIAGNOSIS — Z72.0 TOBACCO ABUSE: ICD-10-CM

## 2023-08-07 DIAGNOSIS — I25.10 ASCVD (ARTERIOSCLEROTIC CARDIOVASCULAR DISEASE): ICD-10-CM

## 2023-08-07 LAB
ALBUMIN SERPL-MCNC: 4.7 GM/DL (ref 3.4–5)
ALP BLD-CCNC: 98 IU/L (ref 40–128)
ALT SERPL-CCNC: 14 U/L (ref 10–40)
ANION GAP SERPL CALCULATED.3IONS-SCNC: 12 MMOL/L (ref 4–16)
AST SERPL-CCNC: 14 IU/L (ref 15–37)
BILIRUB SERPL-MCNC: 0.5 MG/DL (ref 0–1)
BUN SERPL-MCNC: 19 MG/DL (ref 6–23)
CALCIUM SERPL-MCNC: 9.6 MG/DL (ref 8.3–10.6)
CHLORIDE BLD-SCNC: 104 MMOL/L (ref 99–110)
CHOLEST SERPL-MCNC: 212 MG/DL
CO2: 26 MMOL/L (ref 21–32)
CREAT SERPL-MCNC: 1 MG/DL (ref 0.9–1.3)
GFR SERPL CREATININE-BSD FRML MDRD: >60 ML/MIN/1.73M2
GLUCOSE SERPL-MCNC: 105 MG/DL (ref 70–99)
HDLC SERPL-MCNC: 40 MG/DL
LDLC SERPL CALC-MCNC: 107 MG/DL
POTASSIUM SERPL-SCNC: 4.6 MMOL/L (ref 3.5–5.1)
SODIUM BLD-SCNC: 142 MMOL/L (ref 135–145)
TOTAL PROTEIN: 7.2 GM/DL (ref 6.4–8.2)
TRIGL SERPL-MCNC: 324 MG/DL

## 2023-08-07 PROCEDURE — 4004F PT TOBACCO SCREEN RCVD TLK: CPT | Performed by: NURSE PRACTITIONER

## 2023-08-07 PROCEDURE — 3017F COLORECTAL CA SCREEN DOC REV: CPT | Performed by: NURSE PRACTITIONER

## 2023-08-07 PROCEDURE — G8427 DOCREV CUR MEDS BY ELIG CLIN: HCPCS | Performed by: NURSE PRACTITIONER

## 2023-08-07 PROCEDURE — G8417 CALC BMI ABV UP PARAM F/U: HCPCS | Performed by: NURSE PRACTITIONER

## 2023-08-07 PROCEDURE — 36415 COLL VENOUS BLD VENIPUNCTURE: CPT

## 2023-08-07 PROCEDURE — 3079F DIAST BP 80-89 MM HG: CPT | Performed by: NURSE PRACTITIONER

## 2023-08-07 PROCEDURE — 80061 LIPID PANEL: CPT

## 2023-08-07 PROCEDURE — 80053 COMPREHEN METABOLIC PANEL: CPT

## 2023-08-07 PROCEDURE — 99214 OFFICE O/P EST MOD 30 MIN: CPT | Performed by: NURSE PRACTITIONER

## 2023-08-07 PROCEDURE — 3075F SYST BP GE 130 - 139MM HG: CPT | Performed by: NURSE PRACTITIONER

## 2023-08-07 ASSESSMENT — ENCOUNTER SYMPTOMS
COUGH: 0
SHORTNESS OF BREATH: 0

## 2023-08-07 NOTE — PROGRESS NOTES
CARDIOLOGY  NOTE    2023    Marquis Adams (:  1965) is a 62 y.o. [facility] established patient with Dr. Marko Lane, here for evaluation of the following chief complaint(s):  6 Month Follow-Up (No cardiac symptoms)        SUBJECTIVE/OBJECTIVE:    HPI  Ladan Ochoa is a 62y.o. year old who has history as noted below. Ladan Ochoa is under a lot of stress due to wife being sick, work and his cousin  of heart attack   says he does not work regularly but tries to walk 5000 steps /HE is not complaint with diet  He  notices he gest winded when he walking from his parking to office   Ladan Ochoa has history of NSTEMI pci to RCA in 2019.( had pressure behind both shoulders and felt like walking in a fog as presentation)  He says he is under stress and having some \" feeling of tunnel vision and body feels weird and chest feeling unwell\"    He had normal stress test in Dec 2019 and repeat  he did not reach target but completed 12 METS. He .still smokes half a pack of smoke daily   He gest feeling of chest feeling \"weird/ empty feeling  \" but no chest pain , occurs at rest he is worried about broken heart syndrome   He did take testosterone injections and supplements after which his dystonia levels have improved but did not notice any significant difference in energy level except for gaining 14 pounds  He feels that in the past he could not tolerate statins and lately has started noticing that he needs to stretch his muscles more? He works for [employer]       Review of Systems   Constitutional:  Negative for fatigue and fever. Respiratory:  Negative for cough and shortness of breath. Cardiovascular:  Negative for chest pain, palpitations and leg swelling. Musculoskeletal:  Negative for arthralgias and gait problem. Neurological:  Negative for dizziness, syncope, weakness, light-headedness and headaches.      Vitals:    23 0814   BP: 132/84   Site: Right Upper Arm   Position: Sitting   Cuff Size:

## 2023-08-09 ENCOUNTER — TELEPHONE (OUTPATIENT)
Dept: CARDIOLOGY CLINIC | Age: 58
End: 2023-08-09

## 2023-08-09 DIAGNOSIS — E78.2 MIXED HYPERLIPIDEMIA: Primary | ICD-10-CM

## 2023-08-09 NOTE — TELEPHONE ENCOUNTER
Spoke with patient regarding getting lipid panel redone. Reminded him that it is a 12 hour fast. Patient voiced understanding.

## 2023-08-09 NOTE — TELEPHONE ENCOUNTER
Spoke to patient regarding results of lipid panel. Patient states that the night before he did his labs he was up drinking [place] until midnight. Patient would like to get his lipids done again before adding another medication.    ----- Message from DREW Wiseman CNP sent at 8/8/2023 11:11 PM EDT -----  Recommend leqvio or repatha. Lipid panel is very high.  Please ask him to decide what he would like me to do  ----- Message -----  From: [facility] Incoming Lab Results From [facility] (Cristela Rosas)  Sent: 8/7/2023  12:33 PM EDT  To: DREW Wiseman CNP

## 2023-08-10 ENCOUNTER — HOSPITAL ENCOUNTER (OUTPATIENT)
Age: 58
Discharge: HOME | End: 2023-08-10
Payer: COMMERCIAL

## 2023-08-10 ENCOUNTER — TELEPHONE (OUTPATIENT)
Dept: CARDIOLOGY CLINIC | Age: 58
End: 2023-08-10

## 2023-08-10 DIAGNOSIS — E78.2 MIXED HYPERLIPIDEMIA: ICD-10-CM

## 2023-08-10 LAB
CHOLEST SERPL-MCNC: 188 MG/DL
HDLC SERPL-MCNC: 38 MG/DL
LDLC SERPL CALC-MCNC: 103 MG/DL
TRIGL SERPL-MCNC: 233 MG/DL

## 2023-08-10 PROCEDURE — 80061 LIPID PANEL: CPT

## 2023-08-10 PROCEDURE — 36415 COLL VENOUS BLD VENIPUNCTURE: CPT

## 2023-08-10 NOTE — TELEPHONE ENCOUNTER
Patient wants to research repatha and leqvio. He will call us back and let us know.    ----- Message from DREW Clayton CNP sent at 8/10/2023  1:21 PM EDT -----  Please ask him what he would like to do for medication.  LDL is still high.   ----- Message -----  From: [facility] Incoming Lab Results From [employer] (Zay Crespo)  Sent: 8/10/2023  11:16 AM EDT  To: DREW Clayton CNP

## 2023-09-29 ENCOUNTER — TELEPHONE (OUTPATIENT)
Dept: CARDIOLOGY CLINIC | Age: 58
End: 2023-09-29

## 2023-09-29 NOTE — TELEPHONE ENCOUNTER
Cardiologist: Dr. Loc Bhardwaj  Surgeon: Dr. Clarence Umaña  Surgery: Left knee Uni Arthroplasty  Anesthesia: Spinal  Date: TBD  FAX# [phone]    Ph# [phone]    Last OV 8/7/2023 w/Lexus    ASCVD (arteriosclerotic cardiovascular disease)  S/p PCI to RCA for NSTEMI in Feb 2019. continue aspirin 81 mg , continue statins. . He stopped imdur due to \"feeling funny\"he is not exercising anymore , stress test in Dec 2019 showed no ischemia. Unfortunately not very compliant with diet and smoking again  Did stress test 1/1/2022 Completed 12 METS but did  not reach target   NM stress test 2/2023 was normal.   Continue Metoprolol and ASA and Lipitor      Fatigue  He really thinks his  Low energy is due to low testosterone level . He understands he is at  increased cardiac events with  testosterone replacement     Tobacco abuse  He is smoking again   He was encouraged not to smoke anymore      Dyslipidemia   All available lab work was reviewed. Patient was advised to repeat lab work before next visit  He has high triglyceride levels and very high LDL. He has been intolerant of statins in the past .   Continue lipitor 80 mg Daily  Check lipid panel and cmp          Last EKG- 2/7/2023      NM-2/18/2023   ECG portion of stress test is negative for ischemia by diagnostic criteria. Completed 10.12 METS and 9 Mins of exercise   Frequent PVC noted   Normal EF 50 % with normal ventricular contractility. No infarct or ischemia   noted. Normal stress myocardial perfusion. This is a normal study. Echo- 2/18/2023   Left ventricular function and size is normal, EF is estimated at 55-60%. Moderate left ventricular hypertrophy. Grade I diastolic dysfunction. No regional wall motion abnormalities were detected. Moderately dilated left atrium. Sclerotic, but non-stenotic aortic valve. Moderate aortic regurgitation; PHT: 413msec. Mild tricuspid regurgitation; RVSP is 27 mmHg.    Dilation of the aortic root(3.9cm) and

## 2023-10-02 NOTE — TELEPHONE ENCOUNTER
Proceed with surgery no further testing needed   Can hold anticoagulation for 2 days before surgery  Low  risk for surgery  Continue aspirin for procedure   Stop plavix 5 days before surgery

## 2023-11-20 ENCOUNTER — TELEPHONE (OUTPATIENT)
Dept: CARDIOLOGY CLINIC | Age: 58
End: 2023-11-20

## 2023-11-20 NOTE — TELEPHONE ENCOUNTER
Spoke to pt, having similar symptoms of when had blockages in 2019. Exasperated feeling of needing deep breathes, Apple watch reading heart rate as low and not deing able to do an ECG. Felt \" foggy\" on Saturday, however not today.  Wants to be seen to make sure, scheduled in with Deepak Tim CNP

## 2023-11-21 ENCOUNTER — OFFICE VISIT (OUTPATIENT)
Dept: CARDIOLOGY CLINIC | Age: 58
End: 2023-11-21
Payer: COMMERCIAL

## 2023-11-21 ENCOUNTER — HOSPITAL ENCOUNTER (OUTPATIENT)
Age: 58
Discharge: HOME | End: 2023-11-21
Payer: COMMERCIAL

## 2023-11-21 ENCOUNTER — TELEPHONE (OUTPATIENT)
Dept: CARDIOLOGY CLINIC | Age: 58
End: 2023-11-21

## 2023-11-21 VITALS
DIASTOLIC BLOOD PRESSURE: 62 MMHG | BODY MASS INDEX: 32.55 KG/M2 | WEIGHT: 261.8 LBS | HEART RATE: 66 BPM | HEIGHT: 75 IN | SYSTOLIC BLOOD PRESSURE: 108 MMHG

## 2023-11-21 DIAGNOSIS — R06.02 SOB (SHORTNESS OF BREATH): Primary | ICD-10-CM

## 2023-11-21 DIAGNOSIS — R00.1 BRADYCARDIA: ICD-10-CM

## 2023-11-21 LAB
ALBUMIN SERPL-MCNC: 4.2 GM/DL (ref 3.4–5)
ALP BLD-CCNC: 91 IU/L (ref 40–128)
ALT SERPL-CCNC: 15 U/L (ref 10–40)
ANION GAP SERPL CALCULATED.3IONS-SCNC: 8 MMOL/L (ref 4–16)
AST SERPL-CCNC: 14 IU/L (ref 15–37)
BILIRUB SERPL-MCNC: 0.4 MG/DL (ref 0–1)
BUN SERPL-MCNC: 22 MG/DL (ref 6–23)
CALCIUM SERPL-MCNC: 9.8 MG/DL (ref 8.3–10.6)
CHLORIDE BLD-SCNC: 105 MMOL/L (ref 99–110)
CO2: 26 MMOL/L (ref 21–32)
CREAT SERPL-MCNC: 1 MG/DL (ref 0.9–1.3)
GFR SERPL CREATININE-BSD FRML MDRD: >60 ML/MIN/1.73M2
GLUCOSE SERPL-MCNC: 95 MG/DL (ref 70–99)
MAGNESIUM: 2.1 MG/DL (ref 1.8–2.4)
POTASSIUM SERPL-SCNC: 4.7 MMOL/L (ref 3.5–5.1)
SODIUM BLD-SCNC: 139 MMOL/L (ref 135–145)
TOTAL PROTEIN: 6.4 GM/DL (ref 6.4–8.2)
TSH SERPL DL<=0.005 MIU/L-ACNC: 3.63 UIU/ML (ref 0.27–4.2)

## 2023-11-21 PROCEDURE — G8417 CALC BMI ABV UP PARAM F/U: HCPCS | Performed by: NURSE PRACTITIONER

## 2023-11-21 PROCEDURE — 80053 COMPREHEN METABOLIC PANEL: CPT

## 2023-11-21 PROCEDURE — 93000 ELECTROCARDIOGRAM COMPLETE: CPT | Performed by: NURSE PRACTITIONER

## 2023-11-21 PROCEDURE — 3078F DIAST BP <80 MM HG: CPT | Performed by: NURSE PRACTITIONER

## 2023-11-21 PROCEDURE — G8484 FLU IMMUNIZE NO ADMIN: HCPCS | Performed by: NURSE PRACTITIONER

## 2023-11-21 PROCEDURE — 3074F SYST BP LT 130 MM HG: CPT | Performed by: NURSE PRACTITIONER

## 2023-11-21 PROCEDURE — 84443 ASSAY THYROID STIM HORMONE: CPT

## 2023-11-21 PROCEDURE — 99214 OFFICE O/P EST MOD 30 MIN: CPT | Performed by: NURSE PRACTITIONER

## 2023-11-21 PROCEDURE — 4004F PT TOBACCO SCREEN RCVD TLK: CPT | Performed by: NURSE PRACTITIONER

## 2023-11-21 PROCEDURE — G8427 DOCREV CUR MEDS BY ELIG CLIN: HCPCS | Performed by: NURSE PRACTITIONER

## 2023-11-21 PROCEDURE — 3017F COLORECTAL CA SCREEN DOC REV: CPT | Performed by: NURSE PRACTITIONER

## 2023-11-21 PROCEDURE — 36415 COLL VENOUS BLD VENIPUNCTURE: CPT

## 2023-11-21 PROCEDURE — 83735 ASSAY OF MAGNESIUM: CPT

## 2023-11-21 RX ORDER — NITROGLYCERIN 0.4 MG/1
TABLET SUBLINGUAL
Qty: 30 TABLET | Refills: 5 | Status: SHIPPED | OUTPATIENT
Start: 2023-11-21

## 2023-11-21 ASSESSMENT — ENCOUNTER SYMPTOMS
COUGH: 0
SHORTNESS OF BREATH: 1

## 2023-11-21 NOTE — PROGRESS NOTES
CARDIOLOGY  NOTE    2023    Timmy Sparks (:  1965) is a 62 y.o. [facility] established patient with Dr. Norbert Renner, here for evaluation of the following chief complaint(s):  Follow-up (States that apple watch has notified him several times that his hr was in low 40s followed by several  \"inconclusive\" ekg results on watch which prompted him to make visit) and Shortness of Breath (States that he is not short of breath but maybe has to put more effort into breathing, states that sx are similar to prior to having stents placed)        SUBJECTIVE/OBJECTIVE:    PARTHA Garcia has history as noted below. He is feeling off. He states that Thursday he had \"a few drinks\" with a friend and his apple watch told him he was having HR < 40 for more than 10 min. He felt fine. The next day he continued to have the reading multiple time throughout the day. He has felt more \"exasperated\" like he was prior to his MI. He says he does not work regularly but tries to walk 5000 steps /HE is not complaint with diet  He  notices he gest winded when he walking from his parking to office   Royal Garcia has history of NSTEMI pci to RCA in 2019.( had pressure behind both shoulders and felt like walking in a fog as presentation)  He says he is under stress and having some \"feeling of tunnel vision and body feels weird and chest feeling unwell\"    He had normal stress test in Dec 2019 and repeat  he did not reach target but completed 12 METS. He .still smokes half a pack of smoke daily   He gest feeling of chest feeling \"weird/ empty feeling  \" but no chest pain , occurs at rest he is worried about broken heart syndrome   He did take testosterone injections and supplements after which his dystonia levels have improved but did not notice any significant difference in energy level except for gaining 14 pounds  He feels that in the past he could not tolerate statins and lately has started noticing that he needs to stretch his

## 2023-11-21 NOTE — TELEPHONE ENCOUNTER
----- Message from DREW Ashraf CNP sent at 11/21/2023  4:51 PM EST -----  Electrolytes mag and thyroid are normal. We will se what monitor shows  ----- Message -----  From: [facility] Incoming Lab Results From [employer] (Nathalia Section)  Sent: 11/21/2023   2:12 PM EST  To: DREW Ashraf CNP

## 2023-11-28 DIAGNOSIS — I49.3 FREQUENT PVCS: Primary | ICD-10-CM

## 2023-11-28 RX ORDER — MAGNESIUM OXIDE 400 MG/1
400 TABLET ORAL DAILY
Qty: 90 TABLET | Refills: 3 | Status: SHIPPED | OUTPATIENT
Start: 2023-11-28

## 2023-12-04 ENCOUNTER — TELEPHONE (OUTPATIENT)
Dept: CARDIOLOGY CLINIC | Age: 58
End: 2023-12-04

## 2023-12-05 ENCOUNTER — OFFICE VISIT (OUTPATIENT)
Dept: CARDIOLOGY CLINIC | Age: 58
End: 2023-12-05
Payer: COMMERCIAL

## 2023-12-05 VITALS
HEART RATE: 68 BPM | WEIGHT: 260 LBS | SYSTOLIC BLOOD PRESSURE: 100 MMHG | BODY MASS INDEX: 33.37 KG/M2 | DIASTOLIC BLOOD PRESSURE: 66 MMHG | HEIGHT: 74 IN

## 2023-12-05 DIAGNOSIS — E78.2 MIXED HYPERLIPIDEMIA: ICD-10-CM

## 2023-12-05 DIAGNOSIS — I25.10 ASCVD (ARTERIOSCLEROTIC CARDIOVASCULAR DISEASE): ICD-10-CM

## 2023-12-05 DIAGNOSIS — Z72.0 TOBACCO ABUSE: ICD-10-CM

## 2023-12-05 DIAGNOSIS — I10 PRIMARY HYPERTENSION: ICD-10-CM

## 2023-12-05 DIAGNOSIS — I49.3 FREQUENT PVCS: Primary | ICD-10-CM

## 2023-12-05 PROCEDURE — 4004F PT TOBACCO SCREEN RCVD TLK: CPT | Performed by: NURSE PRACTITIONER

## 2023-12-05 PROCEDURE — G8417 CALC BMI ABV UP PARAM F/U: HCPCS | Performed by: NURSE PRACTITIONER

## 2023-12-05 PROCEDURE — 3017F COLORECTAL CA SCREEN DOC REV: CPT | Performed by: NURSE PRACTITIONER

## 2023-12-05 PROCEDURE — G8427 DOCREV CUR MEDS BY ELIG CLIN: HCPCS | Performed by: NURSE PRACTITIONER

## 2023-12-05 PROCEDURE — 99214 OFFICE O/P EST MOD 30 MIN: CPT | Performed by: NURSE PRACTITIONER

## 2023-12-05 PROCEDURE — 3074F SYST BP LT 130 MM HG: CPT | Performed by: NURSE PRACTITIONER

## 2023-12-05 PROCEDURE — 3078F DIAST BP <80 MM HG: CPT | Performed by: NURSE PRACTITIONER

## 2023-12-05 PROCEDURE — G8484 FLU IMMUNIZE NO ADMIN: HCPCS | Performed by: NURSE PRACTITIONER

## 2023-12-05 ASSESSMENT — ENCOUNTER SYMPTOMS
COUGH: 0
SHORTNESS OF BREATH: 1

## 2023-12-05 NOTE — PROGRESS NOTES
CARDIOLOGY  NOTE    2023    Dona Cervantes (:  1965) is a 62 y.o. [facility] established patient with Dr. Mely Gilman, here for evaluation of the following chief complaint(s):  6 Month Follow-Up (Patient has no new cardiac symptoms, but reportedly felt fatigued and SOB on exertion.)        SUBJECTIVE/OBJECTIVE:    HPI  Shayla Schneider has history as noted below. HE is here today to review his Holter monitor results. His wife is with him today. Last OV he stated he was feeling off. He states that Thursday he had \"a few drinks\" with a friend and his apple watch told him he was having HR < 40 for more than 10 min. He felt fine. The next day he continued to have the reading multiple time throughout the day. He has felt more \"exasperated\" like he was prior to his MI. He says he does not work regularly but tries to walk 5000 steps /HE is not complaint with diet  He  notices he gest winded when he walking from his parking to office   Shayla Schneider has history of NSTEMI pci to RCA in 2019.( had pressure behind both shoulders and felt like walking in a fog as presentation)  He says he is under stress and having some \"feeling of tunnel vision and body feels weird and chest feeling unwell\"    He had normal stress test in Dec 2019 and repeat  he did not reach target but completed 12 METS. He .still smokes half a pack of smoke daily   He gest feeling of chest feeling \"weird/ empty feeling  \" but no chest pain , occurs at rest he is worried about broken heart syndrome   He did take testosterone injections and supplements after which his dystonia levels have improved but did not notice any significant difference in energy level except for gaining 14 pounds  He feels that in the past he could not tolerate statins and lately has started noticing that he needs to stretch his muscles more? He works for [employer]       Review of Systems   Constitutional:  Negative for fatigue and fever.    Respiratory:  Positive for

## 2023-12-14 ENCOUNTER — TELEPHONE (OUTPATIENT)
Dept: CARDIOLOGY CLINIC | Age: 58
End: 2023-12-14

## 2023-12-15 ENCOUNTER — INITIAL CONSULT (OUTPATIENT)
Dept: CARDIOLOGY CLINIC | Age: 58
End: 2023-12-15

## 2023-12-15 VITALS
SYSTOLIC BLOOD PRESSURE: 110 MMHG | HEART RATE: 67 BPM | OXYGEN SATURATION: 96 % | DIASTOLIC BLOOD PRESSURE: 64 MMHG | WEIGHT: 263 LBS | HEIGHT: 74 IN | BODY MASS INDEX: 33.75 KG/M2

## 2023-12-15 DIAGNOSIS — R06.02 SOB (SHORTNESS OF BREATH): ICD-10-CM

## 2023-12-15 DIAGNOSIS — I49.3 FREQUENT PVCS: ICD-10-CM

## 2023-12-15 DIAGNOSIS — I49.3 PVC (PREMATURE VENTRICULAR CONTRACTION): Primary | ICD-10-CM

## 2023-12-15 RX ORDER — MAGNESIUM OXIDE 400 MG/1
400 TABLET ORAL DAILY
Qty: 90 TABLET | Refills: 3 | Status: SHIPPED | OUTPATIENT
Start: 2023-12-15

## 2023-12-15 NOTE — PROGRESS NOTES
Electrophysiology Consult Note      Reason for consultation:  PVC    Chief complaint : Shortness of breath    Referring physician: Dr. Federico Dang      Primary care physician: Ana Jeffery MD      History of Present Illness:     Patient is a 59-year-old male with a history of hypertension, hyperlipidemia, kidney disease Conner is referred for PVC management. Patient reports he has been having shortness of breath off-and-on. Mostly with exertion. 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 He denies any chest pain. He denies palpitations. He does drink alcohol and also has high caffeine intake. Patient here for discussion of further management of abnormal monitor.       Pastmedical history:   Past Medical History:   Diagnosis Date    Arthritis     CAD (coronary artery disease)     Elevated prostate specific antigen (PSA) 03/01/2021    H/O cardiovascular stress test 02/19/2019    Significant PVC burdon which reduced with exercise    H/O echocardiogram 02/04/2019    55-60%  Frequest PVCs    History of nuclear stress test 12/13/2019    cardiolite- normal    Hyperlipidemia     Hypertension     NSTEMI (non-ST elevated myocardial infarction) ([address]) 02/02/2019    NSTEMI (non-ST elevated myocardial infarction) ([address]) 2/2/2019    S/P PTCA (percutaneous transluminal coronary angioplasty) 02/02/2019    X 3 stent RCA mid and Distal, PDA       Surgical history :   Past Surgical History:   Procedure Laterality Date    CARDIAC SURGERY  02/03/2019    heart cath with three stents    COLONOSCOPY  11/01/2019    colon polyps, diverticulosis coli    COLONOSCOPY N/A 11/1/2019    COLONOSCOPY POLYPECTOMY SNARE/COLD BIOPSY performed by Dre Philip MD at [address] Right     inguinal       Family history: No sudden

## 2023-12-15 NOTE — PATIENT INSTRUCTIONS
Please be informed that if you contact our office outside of normal business hours the physician on call cannot help with any scheduling or rescheduling issues, procedure instruction questions or any type of medication issue. We advise you for any urgent/emergency that you go to the nearest emergency room! PLEASE CALL OUR OFFICE DURING NORMAL BUSINESS HOURS    Monday - Friday   8 am to 5 pm    [place]: [address] [place]: [phone]    [place]:  [phone]  **It is YOUR responsibilty to bring medication bottles and/or updated medication list to [address]. This will allow us to better serve you and all your healthcare needs**  Thank you for allowing us to care for you today! We want to ensure we can follow your treatment plan and we strive to give you the best outcomes and experience possible. If you ever have a life threatening emergency and call 911 - for an ambulance (EMS)   Our providers can only care for you at:   [facility] or [facility]. Even if you have someone take you or you drive yourself we can only care for you in a [facility] facility. Our providers are not setup at the other healthcare locations! We are committed to providing you the best care possible. If you receive a survey after visiting one of our offices, please take time to share your experience concerning your physician office visit. These surveys are confidential and no health information about you is shared. We are eager to improve for you and we are counting on your feedback to help make that happen.

## 2024-02-06 ENCOUNTER — TELEPHONE (OUTPATIENT)
Dept: CARDIOLOGY CLINIC | Age: 59
End: 2024-02-06

## 2024-02-06 NOTE — TELEPHONE ENCOUNTER
Advised patient to go get Magnesium Glycinate dose 400 mg OTC and try that. If that does not help then give us call back. He does have a follow up on 3/11 with Dr Lundy he can discuss alternative if needed.

## 2024-02-06 NOTE — TELEPHONE ENCOUNTER
Magnesium Oxide 400 mg-it has changed his bowel patterns.  This has constipated patient.   Patient stopped taking this on 2/2. Please advise.

## 2024-02-12 RX ORDER — ATORVASTATIN CALCIUM 80 MG/1
80 TABLET, FILM COATED ORAL NIGHTLY
Qty: 90 TABLET | Refills: 3 | Status: SHIPPED | OUTPATIENT
Start: 2024-02-12

## 2024-03-04 ENCOUNTER — TELEPHONE (OUTPATIENT)
Dept: CARDIOLOGY CLINIC | Age: 59
End: 2024-03-04

## 2024-04-03 ENCOUNTER — TELEPHONE (OUTPATIENT)
Dept: CARDIOLOGY CLINIC | Age: 59
End: 2024-04-03

## 2024-04-03 NOTE — TELEPHONE ENCOUNTER
Called patient to change apt from dr gay to a NP. Patient asked that his apt be r/s d/t patient leaving to go out of town. Patient requested that I email him with some dates. Which was done. Awaiting patients response.

## 2024-04-26 ENCOUNTER — OFFICE VISIT (OUTPATIENT)
Dept: CARDIOLOGY CLINIC | Age: 59
End: 2024-04-26
Payer: COMMERCIAL

## 2024-04-26 VITALS
WEIGHT: 261 LBS | HEART RATE: 68 BPM | HEIGHT: 74 IN | SYSTOLIC BLOOD PRESSURE: 112 MMHG | DIASTOLIC BLOOD PRESSURE: 76 MMHG | BODY MASS INDEX: 33.5 KG/M2

## 2024-04-26 DIAGNOSIS — I10 PRIMARY HYPERTENSION: ICD-10-CM

## 2024-04-26 DIAGNOSIS — I49.3 PVC (PREMATURE VENTRICULAR CONTRACTION): Primary | ICD-10-CM

## 2024-04-26 PROCEDURE — 3074F SYST BP LT 130 MM HG: CPT | Performed by: INTERNAL MEDICINE

## 2024-04-26 PROCEDURE — G8417 CALC BMI ABV UP PARAM F/U: HCPCS | Performed by: INTERNAL MEDICINE

## 2024-04-26 PROCEDURE — 3017F COLORECTAL CA SCREEN DOC REV: CPT | Performed by: INTERNAL MEDICINE

## 2024-04-26 PROCEDURE — G8427 DOCREV CUR MEDS BY ELIG CLIN: HCPCS | Performed by: INTERNAL MEDICINE

## 2024-04-26 PROCEDURE — 93000 ELECTROCARDIOGRAM COMPLETE: CPT | Performed by: INTERNAL MEDICINE

## 2024-04-26 PROCEDURE — 4004F PT TOBACCO SCREEN RCVD TLK: CPT | Performed by: INTERNAL MEDICINE

## 2024-04-26 PROCEDURE — 3078F DIAST BP <80 MM HG: CPT | Performed by: INTERNAL MEDICINE

## 2024-04-26 PROCEDURE — 99214 OFFICE O/P EST MOD 30 MIN: CPT | Performed by: INTERNAL MEDICINE

## 2024-04-26 ASSESSMENT — ENCOUNTER SYMPTOMS
CONSTIPATION: 0
PHOTOPHOBIA: 0
VOMITING: 0
WHEEZING: 0
ABDOMINAL PAIN: 0
CHEST TIGHTNESS: 0
DIARRHEA: 0
SHORTNESS OF BREATH: 1
COLOR CHANGE: 0
COUGH: 0
EYE PAIN: 0
BACK PAIN: 0
NAUSEA: 0
BLOOD IN STOOL: 0

## 2024-04-26 NOTE — PROGRESS NOTES
Electrophysiology FU Note      Reason for consultation:  PVC    Chief complaint :  3 MONTH FOLLOW UP FOR PVC    Referring physician: Dr. Hernandez      Primary care physician: Mychal Pineda MD      History of Present Illness:     This visit (4/26/2024)      Chief Complaint   Patient presents with    Follow-up    3 Month Follow-Up     Patient denies cardiac symptoms. - Denies chest pain, shortness of breath, palpitations, syncope or presyncope, edema    Patient smokes 1/2 pack daily.   Patient consumes liquor 2-3 times a week.   Patient states he consumes caffeine \"all day long, 6am-8pm\"   Patient states he exercises every day.            Previous visit:     Patient is a 58-year-old male with a history of hypertension, hyperlipidemia, kidney disease Conner is referred for PVC management.  Patient reports he has been having shortness of breath off-and-on.  Mostly with exertion.  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  He denies any chest pain.  He denies palpitations.  He does drink alcohol and also has high caffeine intake.  Patient here for discussion of further management of abnormal monitor.      Pastmedical history:   Past Medical History:   Diagnosis Date    Arthritis     CAD (coronary artery disease)     Elevated prostate specific antigen (PSA) 03/01/2021    H/O cardiovascular stress test 02/19/2019    Significant PVC burdon which reduced with exercise    H/O echocardiogram 02/04/2019    55-60%  Frequest PVCs    History of nuclear stress test 12/13/2019    cardiolite- normal    Hyperlipidemia     Hypertension     NSTEMI (non-ST elevated myocardial infarction) ([facility]) 02/02/2019    NSTEMI (non-ST elevated myocardial infarction) ([facility]) 2/2/2019    S/P PTCA (percutaneous transluminal coronary angioplasty) 02/02/2019    X 3 stent RCA

## 2024-06-20 RX ORDER — ATORVASTATIN CALCIUM 80 MG/1
80 TABLET, FILM COATED ORAL NIGHTLY
Qty: 90 TABLET | Refills: 1 | Status: SHIPPED | OUTPATIENT
Start: 2024-06-20

## 2024-06-20 RX ORDER — METOPROLOL TARTRATE 100 MG/1
100 TABLET ORAL 2 TIMES DAILY
Qty: 180 TABLET | Refills: 1 | Status: SHIPPED | OUTPATIENT
Start: 2024-06-20

## 2024-06-20 RX ORDER — OMEPRAZOLE 20 MG/1
20 CAPSULE, DELAYED RELEASE ORAL 2 TIMES DAILY
Qty: 180 CAPSULE | Refills: 1 | Status: SHIPPED | OUTPATIENT
Start: 2024-06-20

## 2024-06-25 RX ORDER — NITROGLYCERIN 0.4 MG/1
TABLET SUBLINGUAL
Qty: 30 TABLET | Refills: 5 | Status: SHIPPED | OUTPATIENT
Start: 2024-06-25

## 2024-06-25 RX ORDER — ATORVASTATIN CALCIUM 80 MG/1
80 TABLET, FILM COATED ORAL NIGHTLY
Qty: 90 TABLET | Refills: 1 | Status: CANCELLED | OUTPATIENT
Start: 2024-06-25

## 2024-06-25 RX ORDER — ASPIRIN 81 MG/1
81 TABLET, CHEWABLE ORAL DAILY
Qty: 90 TABLET | Refills: 3 | Status: SHIPPED | OUTPATIENT
Start: 2024-06-25

## 2024-06-25 RX ORDER — OMEPRAZOLE 20 MG/1
20 CAPSULE, DELAYED RELEASE ORAL 2 TIMES DAILY
Qty: 180 CAPSULE | Refills: 1 | Status: CANCELLED | OUTPATIENT
Start: 2024-06-25

## 2024-06-25 RX ORDER — METOPROLOL TARTRATE 100 MG/1
100 TABLET ORAL 2 TIMES DAILY
Qty: 180 TABLET | Refills: 1 | Status: CANCELLED | OUTPATIENT
Start: 2024-06-25

## 2024-06-25 NOTE — TELEPHONE ENCOUNTER
90 supply ( spouse has requested a year)   [facility] in [place], [place]   Next OV 10/29/24.

## 2024-08-19 RX ORDER — ATORVASTATIN CALCIUM 80 MG/1
80 TABLET, FILM COATED ORAL NIGHTLY
Qty: 90 TABLET | Refills: 1 | Status: SHIPPED | OUTPATIENT
Start: 2024-08-19

## 2024-08-20 ENCOUNTER — TELEPHONE (OUTPATIENT)
Dept: CARDIOLOGY CLINIC | Age: 59
End: 2024-08-20

## 2024-08-26 RX ORDER — METOPROLOL TARTRATE 100 MG
100 TABLET ORAL 2 TIMES DAILY
Qty: 180 TABLET | Refills: 1 | Status: SHIPPED | OUTPATIENT
Start: 2024-08-26

## 2024-08-30 ENCOUNTER — OFFICE VISIT (OUTPATIENT)
Dept: CARDIOLOGY CLINIC | Age: 59
End: 2024-08-30
Payer: COMMERCIAL

## 2024-08-30 VITALS
BODY MASS INDEX: 33.24 KG/M2 | HEART RATE: 72 BPM | SYSTOLIC BLOOD PRESSURE: 120 MMHG | WEIGHT: 259 LBS | DIASTOLIC BLOOD PRESSURE: 68 MMHG | HEIGHT: 74 IN

## 2024-08-30 DIAGNOSIS — E78.2 MIXED HYPERLIPIDEMIA: ICD-10-CM

## 2024-08-30 DIAGNOSIS — I25.10 ASCVD (ARTERIOSCLEROTIC CARDIOVASCULAR DISEASE): Primary | ICD-10-CM

## 2024-08-30 DIAGNOSIS — Z72.0 TOBACCO ABUSE: ICD-10-CM

## 2024-08-30 DIAGNOSIS — I25.2 H/O NON-ST ELEVATION MYOCARDIAL INFARCTION (NSTEMI): ICD-10-CM

## 2024-08-30 DIAGNOSIS — I49.3 FREQUENT PVCS: ICD-10-CM

## 2024-08-30 DIAGNOSIS — I10 PRIMARY HYPERTENSION: ICD-10-CM

## 2024-08-30 PROCEDURE — 3078F DIAST BP <80 MM HG: CPT | Performed by: NURSE PRACTITIONER

## 2024-08-30 PROCEDURE — 99214 OFFICE O/P EST MOD 30 MIN: CPT | Performed by: NURSE PRACTITIONER

## 2024-08-30 PROCEDURE — G8417 CALC BMI ABV UP PARAM F/U: HCPCS | Performed by: NURSE PRACTITIONER

## 2024-08-30 PROCEDURE — 4004F PT TOBACCO SCREEN RCVD TLK: CPT | Performed by: NURSE PRACTITIONER

## 2024-08-30 PROCEDURE — 3017F COLORECTAL CA SCREEN DOC REV: CPT | Performed by: NURSE PRACTITIONER

## 2024-08-30 PROCEDURE — G8427 DOCREV CUR MEDS BY ELIG CLIN: HCPCS | Performed by: NURSE PRACTITIONER

## 2024-08-30 PROCEDURE — 3074F SYST BP LT 130 MM HG: CPT | Performed by: NURSE PRACTITIONER

## 2024-08-30 ASSESSMENT — ENCOUNTER SYMPTOMS
COUGH: 0
SHORTNESS OF BREATH: 0

## 2024-08-30 NOTE — PROGRESS NOTES
CARDIOLOGY  NOTE    2024    Prasad Buchanan (:  1965) is a 58 y.o. male,an established patient with Dr. Hernandez, here for evaluation of the following chief complaint(s):  Follow-up (Pt denies any cardiac symptoms )        SUBJECTIVE/OBJECTIVE:    HPI  Prasad is here to follow up on his cardiovascular health    He states that he is doing fairly well. He has not been exercising much. He states that he is still smoking and drinking.     Last OV he stated he was feeling off. He states that Thursday he had \"a few drinks\" with a friend and his apple watch told him he was having HR < 40 for more than 10 min. He felt fine. The next day he continued to have the reading multiple time throughout the day. He has felt more \"exasperated\" like he was prior to his MI.     He says he does not work regularly but tries to walk 5000 steps /HE is not complaint with diet  He  notices he gest winded when he walking from his parking to office   Prasad has history of NSTEMI pci to RCA in 2019.( had pressure behind both shoulders and felt like walking in a fog as presentation)  He says he is under stress and having some \"feeling of tunnel vision and body feels weird and chest feeling unwell\"    He had normal stress test in Dec 2019 and repeat  he did not reach target but completed 12 METS.He .still smokes half a pack of smoke daily   He gest feeling of chest feeling \"weird/ empty feeling  \" but no chest pain , occurs at rest he is worried about broken heart syndrome   He did take testosterone injections and supplements after which his dystonia levels have improved but did not notice any significant difference in energy level except for gaining 14 pounds  He feels that in the past he could not tolerate statins and lately has started noticing that he needs to stretch his muscles more?    He works for [employer]       Review of Systems   Constitutional:  Negative for fatigue and fever.   Respiratory:  Negative for

## 2024-08-30 NOTE — PATIENT INSTRUCTIONS
**It is YOUR responsibilty to bring medication bottles and/or updated medication list to EACH APPOINTMENT. This will allow us to better serve you and all your healthcare needs**  Thank you for allowing us to care for you today!   We want to ensure we can follow your treatment plan and we strive to give you the best outcomes and experience possible.   If you ever have a life threatening emergency and call 911 - for an ambulance (EMS)   Our providers can only care for you at:   [facility] or [facility].   Even if you have someone take you or you drive yourself we can only care for you in a [facility]. Our providers are not setup at the other healthcare locations!   Please be informed that if you contact our office outside of normal business hours the physician on call cannot help with any scheduling or rescheduling issues, procedure instruction questions or any type of medication issue.    We advise you for any urgent/emergency that you go to the nearest emergency room!    PLEASE CALL OUR OFFICE DURING NORMAL BUSINESS HOURS    Monday - Friday   8 am to 5 pm    [place]: [phone]    [place]: [phone]    [place]:  [phone]  We are committed to providing you the best care possible.    If you receive a survey after visiting one of our offices, please take time to share your experience concerning your physician office visit.  These surveys are confidential and no health information about you is shared.    We are eager to improve for you and we are counting on your feedback to help make that happen.

## 2024-09-03 ENCOUNTER — HOSPITAL ENCOUNTER (OUTPATIENT)
Age: 59
Discharge: HOME | End: 2024-09-03
Payer: COMMERCIAL

## 2024-09-03 DIAGNOSIS — E78.2 MIXED HYPERLIPIDEMIA: ICD-10-CM

## 2024-09-03 LAB
ALBUMIN SERPL-MCNC: 4.4 GM/DL (ref 3.4–5)
ALP BLD-CCNC: 99 IU/L (ref 40–128)
ALT SERPL-CCNC: 17 U/L (ref 10–40)
ANION GAP SERPL CALCULATED.3IONS-SCNC: 9 MMOL/L (ref 7–16)
AST SERPL-CCNC: 16 IU/L (ref 15–37)
BILIRUB SERPL-MCNC: 0.3 MG/DL (ref 0–1)
BUN SERPL-MCNC: 23 MG/DL (ref 6–23)
CALCIUM SERPL-MCNC: 9.2 MG/DL (ref 8.3–10.6)
CHLORIDE BLD-SCNC: 105 MMOL/L (ref 99–110)
CHOLEST SERPL-MCNC: 188 MG/DL
CO2: 28 MMOL/L (ref 21–32)
CREAT SERPL-MCNC: 1 MG/DL (ref 0.9–1.3)
GFR, ESTIMATED: 87 ML/MIN/1.73M2
GLUCOSE SERPL-MCNC: 110 MG/DL (ref 70–99)
HDLC SERPL-MCNC: 34 MG/DL
LDLC SERPL CALC-MCNC: 87 MG/DL
POTASSIUM SERPL-SCNC: 4.4 MMOL/L (ref 3.5–5.1)
SODIUM BLD-SCNC: 142 MMOL/L (ref 135–145)
TOTAL PROTEIN: 7.3 GM/DL (ref 6.4–8.2)
TRIGL SERPL-MCNC: 336 MG/DL

## 2024-09-03 PROCEDURE — 80053 COMPREHEN METABOLIC PANEL: CPT

## 2024-09-03 PROCEDURE — 80061 LIPID PANEL: CPT

## 2024-09-04 ENCOUNTER — TELEPHONE (OUTPATIENT)
Dept: CARDIOLOGY CLINIC | Age: 59
End: 2024-09-04

## 2024-09-04 NOTE — TELEPHONE ENCOUNTER
----- Message from DREW Dietz CNP sent at 9/3/2024 10:32 AM EDT -----  LDL remains high. Goal LDL < 55.   Recommend zetia or [id number] discussed at OV and he was not too interested. Just make sure he knows I still recommend it.  ----- Message -----  From: [facility] Incoming Lab Results From [facility] (Epic Adt)  Sent: 9/3/2024  10:04 AM EDT  To: DREW Pugh CNP

## 2024-09-04 NOTE — TELEPHONE ENCOUNTER
Called pt in regards to testing results. Pt voiced understanding and states he wants to not take any other drugs as of right now and would like to stick to his current plan.                  Component  Ref Range & Units 1 d ago  (9/3/24) 1 yr ago  (8/10/23) 1 yr ago  (8/7/23) 1 yr ago  (12/7/22) 2 yr ago  (12/27/21) 3 yr ago  (12/12/20) 4 yr ago  (5/15/20)   Triglycerides  <150 MG/ High  233 High  324 High  161 High  217 High  153 High  143   Cholesterol, Total  <200 MG/ 188  High      CM   Comment: (NOTE)  Cardiovascular risk evaluation guidelines:  Low Risk        <200 mg/dL  Average Risk    200-240 mg/dL  High Risk       >240 mg/dL     HDL  >40 MG/DL 34 Low  38 Low  40 Low  35 Low  35 Low  29 Low  33 Low    LDL Cholesterol  <100 MG/DL 87 103 High  107 High  108 High       LDL Direct     117 High  R 99 R 98 R

## 2024-09-13 ENCOUNTER — TELEPHONE (OUTPATIENT)
Dept: GASTROENTEROLOGY | Age: 59
End: 2024-09-13

## 2024-09-13 NOTE — TELEPHONE ENCOUNTER
Patient called to ask if Dr No could give him a call concerning colonoscopy that he had in 2019 because his parents both have colorectal cancer and his provider had advised him to discuss if he should have a colonoscopy before his 10 year esperanza.    Thank you

## 2024-09-16 RX ORDER — ATORVASTATIN CALCIUM 80 MG/1
80 TABLET, FILM COATED ORAL NIGHTLY
Qty: 90 TABLET | Refills: 1 | OUTPATIENT
Start: 2024-09-16

## 2024-10-15 ENCOUNTER — PREP FOR PROCEDURE (OUTPATIENT)
Dept: GASTROENTEROLOGY | Age: 59
End: 2024-10-15

## 2024-10-15 DIAGNOSIS — Z80.0 FAMILY HISTORY OF COLON CANCER REQUIRING SCREENING COLONOSCOPY: ICD-10-CM

## 2024-10-15 RX ORDER — SODIUM CHLORIDE 0.9 % (FLUSH) 0.9 %
5-40 SYRINGE (ML) INJECTION EVERY 12 HOURS SCHEDULED
Status: CANCELLED | OUTPATIENT
Start: 2024-10-15

## 2024-10-15 RX ORDER — SODIUM CHLORIDE 0.9 % (FLUSH) 0.9 %
5-40 SYRINGE (ML) INJECTION PRN
Status: CANCELLED | OUTPATIENT
Start: 2024-10-15

## 2024-10-15 RX ORDER — SODIUM CHLORIDE 9 MG/ML
25 INJECTION, SOLUTION INTRAVENOUS PRN
Status: CANCELLED | OUTPATIENT
Start: 2024-10-15

## 2024-10-15 RX ORDER — SODIUM CHLORIDE, SODIUM LACTATE, POTASSIUM CHLORIDE, CALCIUM CHLORIDE 600; 310; 30; 20 MG/100ML; MG/100ML; MG/100ML; MG/100ML
INJECTION, SOLUTION INTRAVENOUS CONTINUOUS
Status: CANCELLED | OUTPATIENT
Start: 2024-10-15

## 2024-10-29 ENCOUNTER — OFFICE VISIT (OUTPATIENT)
Dept: CARDIOLOGY CLINIC | Age: 59
End: 2024-10-29
Payer: COMMERCIAL

## 2024-10-29 VITALS
BODY MASS INDEX: 33.21 KG/M2 | SYSTOLIC BLOOD PRESSURE: 120 MMHG | HEIGHT: 74 IN | DIASTOLIC BLOOD PRESSURE: 70 MMHG | WEIGHT: 258.8 LBS | HEART RATE: 61 BPM

## 2024-10-29 DIAGNOSIS — I49.3 FREQUENT PVCS: Primary | ICD-10-CM

## 2024-10-29 DIAGNOSIS — I10 PRIMARY HYPERTENSION: ICD-10-CM

## 2024-10-29 PROCEDURE — G8427 DOCREV CUR MEDS BY ELIG CLIN: HCPCS | Performed by: NURSE PRACTITIONER

## 2024-10-29 PROCEDURE — 93000 ELECTROCARDIOGRAM COMPLETE: CPT | Performed by: NURSE PRACTITIONER

## 2024-10-29 PROCEDURE — 99214 OFFICE O/P EST MOD 30 MIN: CPT | Performed by: NURSE PRACTITIONER

## 2024-10-29 PROCEDURE — 3017F COLORECTAL CA SCREEN DOC REV: CPT | Performed by: NURSE PRACTITIONER

## 2024-10-29 PROCEDURE — 3074F SYST BP LT 130 MM HG: CPT | Performed by: NURSE PRACTITIONER

## 2024-10-29 PROCEDURE — G8484 FLU IMMUNIZE NO ADMIN: HCPCS | Performed by: NURSE PRACTITIONER

## 2024-10-29 PROCEDURE — 3078F DIAST BP <80 MM HG: CPT | Performed by: NURSE PRACTITIONER

## 2024-10-29 PROCEDURE — G8417 CALC BMI ABV UP PARAM F/U: HCPCS | Performed by: NURSE PRACTITIONER

## 2024-10-29 PROCEDURE — 4004F PT TOBACCO SCREEN RCVD TLK: CPT | Performed by: NURSE PRACTITIONER

## 2024-10-29 ASSESSMENT — ENCOUNTER SYMPTOMS
PHOTOPHOBIA: 0
BACK PAIN: 0
ABDOMINAL PAIN: 0
SHORTNESS OF BREATH: 0
COUGH: 0
SINUS PRESSURE: 0
BLOOD IN STOOL: 0
ABDOMINAL DISTENTION: 0
SINUS PAIN: 0
COLOR CHANGE: 0

## 2024-10-29 NOTE — PROGRESS NOTES
Electrophysiology FU Note      Reason for consultation:  PVC    Chief complaint : follow up on PVC    Referring physician: Dr. Hernandez      Primary care physician: Montana Pepper MD      History of Present Illness:     This visit 10/29/2024  Patient is here today for follow-up on PVCs.  Patient reports he is feels well.  He denies chest pain, palpitations, shortness of breath, lightheadedness, dizziness, edema or syncope.  He does report that he had swelling in his right hand.  His primary care doctor is running labs and assessing him for possible autoimmune versus allergic reaction.    Previous visit (4/26/2024)      Chief Complaint   Patient presents with    Follow-up    3 Month Follow-Up     Patient denies cardiac symptoms. - Denies chest pain, shortness of breath, palpitations, syncope or presyncope, edema    Patient smokes 1/2 pack daily.   Patient consumes liquor 2-3 times a week.   Patient states he consumes caffeine \"all day long, 6am-8pm\"   Patient states he exercises every day.            Previous visit:     Patient is a 58-year-old male with a history of hypertension, hyperlipidemia, kidney disease Conner is referred for PVC management.  Patient reports he has been having shortness of breath off-and-on.  Mostly with exertion.  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  He denies any chest pain.  He denies palpitations.  He does drink alcohol and also has high caffeine intake.  Patient here for discussion of further management of abnormal monitor.      Pastmedical history:   Past Medical History:   Diagnosis Date    Arthritis     CAD (coronary artery disease)     Elevated prostate specific antigen (PSA) 03/01/2021    H/O cardiovascular stress test 02/19/2019    Significant PVC burdon which reduced with exercise    H/O

## 2024-11-06 ENCOUNTER — HOSPITAL ENCOUNTER (OUTPATIENT)
Age: 59
Discharge: HOME | End: 2024-11-06
Payer: COMMERCIAL

## 2024-11-06 PROCEDURE — 84153 ASSAY OF PSA TOTAL: CPT

## 2024-11-06 PROCEDURE — 84154 ASSAY OF PSA FREE: CPT

## 2024-11-06 PROCEDURE — 36415 COLL VENOUS BLD VENIPUNCTURE: CPT

## 2024-11-09 LAB
PROSTATE SPECIFIC ANTIGEN FREE: 0.4 NG/ML
PROSTATE SPECIFIC ANTIGEN PERCENT FREE: 17 %
PROSTATE SPECIFIC ANTIGEN: 2.3 NG/ML (ref 0–4)

## 2024-12-02 NOTE — PROGRESS NOTES
Patient will arrive at 0630 at [facility] on 12/9/2024 for his procedure at 0800.    NOTHING TO EAT OR DRINK AFTER MIDNIGHT DAY OF SURGERY    1. Enter thru the hospital main entrance on day of surgery, check in at the Information Desk. If you arrive prior to 6:00am, enter thru the ER entrance.    2. Follow the directions as prescribed by the doctor for your procedure and medications.         Morning of surgery take:metoprolol and prilosec.         Stop vitamins, supplements and NSAIDS:      3. Check with your Doctor regarding stopping blood thinners and follow their instructions.    4. Do not smoke, vape or use chewing tobacco morning of surgery. Do not drink any alcoholic beverages 24 hours prior to surgery.       This includes NA Beer. No street drugs 7 days prior to surgery.    5. If you have dentures, contacts of glasses they will be removed before going to the OR; please bring a case.    6. Please bring picture ID, insurance card, paperwork from the doctor’s office (H & P, Consent, & card for implantable devices).    7. Take a shower with an antibacterial soap the night before surgery and the morning of surgery. Do not put anything on your skin      After your morning shower.    8. You will need a responsible adult to drive you home and check on you after surgery.

## 2024-12-06 ENCOUNTER — ANESTHESIA EVENT (OUTPATIENT)
Dept: ENDOSCOPY | Age: 59
End: 2024-12-06
Payer: COMMERCIAL

## 2024-12-06 NOTE — PROGRESS NOTES
Spoke with patient and he will arrive at 0630 at [facility] on 12/9/2024 for his procedure at 0800.

## 2024-12-06 NOTE — ANESTHESIA PRE PROCEDURE
Department of Anesthesiology  Preprocedure Note       Name:  Prasad Buchanan   Age:  58 y.o.  :  1965                                          MRN:  [id number]         Date:  2024      Surgeon: Surgeon(s):  Dain No MD    Procedure: Procedure(s):  COLONOSCOPY DIAGNOSTIC    Medications prior to admission:   Prior to Admission medications    Medication Sig Start Date End Date Taking? Authorizing Provider   metoprolol (LOPRESSOR) 100 MG tablet Take 1 tablet by mouth 2 times daily 24   Lexus Cohen APRN - CNP   atorvastatin (LIPITOR) 80 MG tablet Take 1 tablet by mouth nightly 24   Lexus Cohen APRN - CNP   nitroGLYCERIN (NITROSTAT) 0.4 MG SL tablet up to max of 3 total doses. If no relief after 1 dose, call 911. 24   Lexus Cohen APRN - CNP   aspirin 81 MG chewable tablet Take 1 tablet by mouth daily Enteric formulation 24   Lexus Cohen APRN - CNP   omeprazole (PRILOSEC) 20 MG delayed release capsule Take 1 capsule by mouth in the morning and 1 capsule in the evening. 24   Lexus Cohen APRN - CNP   Coenzyme Q10 (COQ10) 200 MG CAPS Take by mouth 2 times daily    Reinaldo Abraham MD   Omega-3 Fatty Acids (FISH OIL) 1000 MG CAPS Take 3 capsules by mouth 2 times daily    Reinaldo Abraham MD   Cholecalciferol (VITAMIN D3) 50 MCG ( UT) CAPS Take by mouth    Reinaldo Abraham MD   zinc gluconate 50 MG tablet Take 1 tablet by mouth daily    Reinaldo Abraham MD   Vitamin E 400 units TABS Take by mouth daily    Reinaldo Abraham MD   Carboxymethylcellulose Sodium (EYE DROPS OP) Apply to eye daily    Reinaldo Abraham MD   loratadine (CLARITIN) 10 MG tablet Take 0.5 tablets by mouth every morning    Reinaldo Abraham MD       Current medications:    No current facility-administered medications for this encounter.     Current Outpatient Medications   Medication Sig Dispense Refill   • metoprolol (LOPRESSOR) 100 MG tablet

## 2024-12-06 NOTE — ANESTHESIA PRE PROCEDURE
Department of Anesthesiology  Preprocedure Note       Name:  Prasad Buchanan   Age:  58 y.o.  :  1965                                          MRN:  [id number]         Date:  2024      Surgeon: Surgeon(s):  Dain No MD    Procedure: COLONOSCOPY DIAGNOSTIC    Medications prior to admission:   Prior to Admission medications    Medication Sig Start Date End Date Taking? Authorizing Provider   metoprolol (LOPRESSOR) 100 MG tablet Take 1 tablet by mouth 2 times daily 24   Lexus Cohen APRN - CNP   atorvastatin (LIPITOR) 80 MG tablet Take 1 tablet by mouth nightly 24   Lexus Cohen APRN - CNP   nitroGLYCERIN (NITROSTAT) 0.4 MG SL tablet up to max of 3 total doses. If no relief after 1 dose, call 911. 24   Lexus Cohen APRN - CNP   aspirin 81 MG chewable tablet Take 1 tablet by mouth daily Enteric formulation 24   Lexus Cohen APRN - CNP   omeprazole (PRILOSEC) 20 MG delayed release capsule Take 1 capsule by mouth in the morning and 1 capsule in the evening. 24   Lexus Cohen APRN - CNP   Coenzyme Q10 (COQ10) 200 MG CAPS Take by mouth 2 times daily    Reinaldo Abraham MD   Omega-3 Fatty Acids (FISH OIL) 1000 MG CAPS Take 3 capsules by mouth 2 times daily    Reinaldo Abraham MD   Cholecalciferol (VITAMIN D3) 50 MCG (2000 UT) CAPS Take by mouth    Reinaldo Abraham MD   zinc gluconate 50 MG tablet Take 1 tablet by mouth daily    Reinaldo Abraham MD   Vitamin E 400 units TABS Take by mouth daily    Reinaldo Abraham MD   Carboxymethylcellulose Sodium (EYE DROPS OP) Apply to eye daily    Reinaldo Abraham MD   loratadine (CLARITIN) 10 MG tablet Take 0.5 tablets by mouth every morning    Reinaldo Abraham MD       Current medications:    No current facility-administered medications for this encounter.     Current Outpatient Medications   Medication Sig Dispense Refill   • metoprolol (LOPRESSOR) 100 MG tablet Take 1 tablet by

## 2024-12-09 ENCOUNTER — ANESTHESIA (OUTPATIENT)
Dept: ENDOSCOPY | Age: 59
End: 2024-12-09
Payer: COMMERCIAL

## 2024-12-09 ENCOUNTER — HOSPITAL ENCOUNTER (OUTPATIENT)
Age: 59
Setting detail: OUTPATIENT SURGERY
Discharge: HOME | End: 2024-12-09
Attending: SPECIALIST | Admitting: SPECIALIST
Payer: COMMERCIAL

## 2024-12-09 VITALS
WEIGHT: 258 LBS | RESPIRATION RATE: 18 BRPM | BODY MASS INDEX: 33.11 KG/M2 | SYSTOLIC BLOOD PRESSURE: 114 MMHG | HEIGHT: 74 IN | OXYGEN SATURATION: 95 % | HEART RATE: 64 BPM | DIASTOLIC BLOOD PRESSURE: 70 MMHG | TEMPERATURE: 97 F

## 2024-12-09 DIAGNOSIS — Z80.0 FAMILY HISTORY OF COLON CANCER REQUIRING SCREENING COLONOSCOPY: ICD-10-CM

## 2024-12-09 PROBLEM — D12.0 BENIGN NEOPLASM OF CECUM: Status: ACTIVE | Noted: 2024-12-09

## 2024-12-09 PROBLEM — D12.7 BENIGN NEOPLASM OF RECTOSIGMOID JUNCTION: Status: ACTIVE | Noted: 2024-12-09

## 2024-12-09 PROCEDURE — 3700000000 HC ANESTHESIA ATTENDED CARE: Performed by: SPECIALIST

## 2024-12-09 PROCEDURE — 2580000003 HC RX 258: Performed by: SPECIALIST

## 2024-12-09 PROCEDURE — 6360000002 HC RX W HCPCS

## 2024-12-09 PROCEDURE — 7100000010 HC PHASE II RECOVERY - FIRST 15 MIN: Performed by: SPECIALIST

## 2024-12-09 PROCEDURE — 88305 TISSUE EXAM BY PATHOLOGIST: CPT | Performed by: PATHOLOGY

## 2024-12-09 PROCEDURE — 7100000011 HC PHASE II RECOVERY - ADDTL 15 MIN: Performed by: SPECIALIST

## 2024-12-09 PROCEDURE — 3609010600 HC COLONOSCOPY POLYPECTOMY SNARE/COLD BIOPSY: Performed by: SPECIALIST

## 2024-12-09 PROCEDURE — 3700000001 HC ADD 15 MINUTES (ANESTHESIA): Performed by: SPECIALIST

## 2024-12-09 PROCEDURE — 45385 COLONOSCOPY W/LESION REMOVAL: CPT | Performed by: SPECIALIST

## 2024-12-09 PROCEDURE — 2709999900 HC NON-CHARGEABLE SUPPLY: Performed by: SPECIALIST

## 2024-12-09 RX ORDER — LIDOCAINE HYDROCHLORIDE 20 MG/ML
INJECTION, SOLUTION INFILTRATION; PERINEURAL
Status: DISCONTINUED | OUTPATIENT
Start: 2024-12-09 | End: 2024-12-09 | Stop reason: SDUPTHER

## 2024-12-09 RX ORDER — SODIUM CHLORIDE 0.9 % (FLUSH) 0.9 %
5-40 SYRINGE (ML) INJECTION PRN
Status: DISCONTINUED | OUTPATIENT
Start: 2024-12-09 | End: 2024-12-09 | Stop reason: HOSPADM

## 2024-12-09 RX ORDER — SODIUM CHLORIDE, SODIUM LACTATE, POTASSIUM CHLORIDE, CALCIUM CHLORIDE 600; 310; 30; 20 MG/100ML; MG/100ML; MG/100ML; MG/100ML
INJECTION, SOLUTION INTRAVENOUS CONTINUOUS
Status: DISCONTINUED | OUTPATIENT
Start: 2024-12-09 | End: 2024-12-09 | Stop reason: HOSPADM

## 2024-12-09 RX ORDER — SODIUM CHLORIDE 9 MG/ML
25 INJECTION, SOLUTION INTRAVENOUS PRN
Status: DISCONTINUED | OUTPATIENT
Start: 2024-12-09 | End: 2024-12-09 | Stop reason: HOSPADM

## 2024-12-09 RX ORDER — SODIUM CHLORIDE 0.9 % (FLUSH) 0.9 %
5-40 SYRINGE (ML) INJECTION EVERY 12 HOURS SCHEDULED
Status: DISCONTINUED | OUTPATIENT
Start: 2024-12-09 | End: 2024-12-09 | Stop reason: HOSPADM

## 2024-12-09 RX ORDER — PROPOFOL 10 MG/ML
INJECTION, EMULSION INTRAVENOUS
Status: DISCONTINUED | OUTPATIENT
Start: 2024-12-09 | End: 2024-12-09 | Stop reason: SDUPTHER

## 2024-12-09 RX ADMIN — LIDOCAINE HYDROCHLORIDE 60 MG: 20 INJECTION, SOLUTION INFILTRATION; PERINEURAL at 08:01

## 2024-12-09 RX ADMIN — PROPOFOL 600 MG: 10 INJECTION, EMULSION INTRAVENOUS at 08:01

## 2024-12-09 RX ADMIN — PHENYLEPHRINE HYDROCHLORIDE 100 MCG: 10 INJECTION INTRAVENOUS at 08:22

## 2024-12-09 RX ADMIN — SODIUM CHLORIDE, PRESERVATIVE FREE 10 ML: 5 INJECTION INTRAVENOUS at 07:13

## 2024-12-09 RX ADMIN — PHENYLEPHRINE HYDROCHLORIDE 100 MCG: 10 INJECTION INTRAVENOUS at 08:20

## 2024-12-09 ASSESSMENT — PAIN - FUNCTIONAL ASSESSMENT: PAIN_FUNCTIONAL_ASSESSMENT: 0-10

## 2024-12-09 ASSESSMENT — LIFESTYLE VARIABLES: SMOKING_STATUS: 1

## 2024-12-09 ASSESSMENT — PAIN SCALES - GENERAL
PAINLEVEL_OUTOF10: 0
PAINLEVEL_OUTOF10: 0

## 2024-12-09 NOTE — DISCHARGE INSTRUCTIONS
COLONOSCOPY      FOLLOW UP APPOINTMENT IN : AS NEEDED.     REPEAT PROCEDURE IN _5__YEARS OR AS NEEDED.    TEST ORDERED: NONE    What to expect at home:  Your Recovery   Your doctor will tell you when you can eat and do your other usual activities Your doctor will talk to you about when you will need your next colonoscopy. Your doctor can help you decide how often you need to be checked. This will depend on the results of your test and your risk for colorectal cancer.  After the test, you may be bloated or have gas pains. You may need to pass gas. If a biopsy was done or a polyp was removed, you may have streaks of blood in your stool (feces) for a few days.  This care sheet gives you a general idea about how long it will take for you to recover. But each person recovers at a different pace. Follow the steps below to get better as quickly as possible.  How can you care for yourself at home?  Activity  Rest when you feel tired.  Diet  Follow your doctor's directions for eating.  Unless your doctor has told you not to, drink plenty of fluids. This helps to replace the fluids that were lost during the colon prep.  DO NOT DRINK ALCOHOL.  Medicines  Your doctor will tell you if and when you can restart your medicines. He or she will also give you instructions about taking any new medicines.  If you take blood thinners, such as warfarin (Coumadin), clopidogrel (Plavix), or aspirin, be sure to talk to your doctor. He or she will tell you if and when to start taking those medicines again. Make sure that you understand exactly what your doctor wants you to do.  If polyps were removed or a biopsy was done during the test, your doctor may tell you not to take aspirin or other anti-inflammatory medicines for a few days. These include ibuprofen (Advil, Motrin) and naproxen (Aleve).  Other instructions: Anethesia  For your safety, do not drive or operate machinery for 24 hours.  Do not sign legal documents or make major decisions

## 2024-12-09 NOTE — ANESTHESIA POSTPROCEDURE EVALUATION
Department of Anesthesiology  Postprocedure Note    Patient: Prasad Buchanan  MRN: [id number]  YOB: 1965  Date of evaluation: 12/9/2024    Procedure Summary       Date: 12/09/24 Room / Location: [place] [address] / [facility]    Anesthesia Start: 0800 Anesthesia Stop: 0837    Procedure: COLONOSCOPY POLYPECTOMY SNARE/BIOPSY Diagnosis:       Family history of colon cancer requiring screening colonoscopy      (Family history of colon cancer requiring screening colonoscopy [Z80.0])    Surgeons: Dain No MD Responsible Provider: Jeffrey Harrington MD    Anesthesia Type: MAC ASA Status: 3            Anesthesia Type: MAC    Ben Phase I: Ebn Score: 10    Ben Phase II:      Anesthesia Post Evaluation    Patient location during evaluation: bedside  Patient participation: complete - patient participated  Level of consciousness: awake  Pain score: 0  Airway patency: patent  Nausea & Vomiting: no nausea and no vomiting  Cardiovascular status: hemodynamically stable  Respiratory status: room air and spontaneous ventilation  Hydration status: euvolemic  Pain management: adequate    No notable events documented.

## 2024-12-09 NOTE — BRIEF OP NOTE
BRIEF COLONOSCOPY REPORT:   The original colonoscopy report with photos can be found by going to \"chart review\" then \"notes\" then \"colonoscopy\" then \"scan....\"    Impression:         -  One 2 mm polyp in the cecum, removed with a cold snare.  Resected and            retrieved.         -  One 4 mm polyp at 10 cm proximal to the anus, removed with a cold snare.            Resected and retrieved.         -  Moderate diverticulosis in the sigmoid colon.         -  Internal hemorrhoids.         -  The examination was otherwise normal on direct and retroflexion views  .  Recommendation:         -  Repeat colonoscopy in 3 years for surveillance.

## 2024-12-10 LAB — SURGICAL PATHOLOGY REPORT: NORMAL

## 2024-12-12 ENCOUNTER — HOSPITAL ENCOUNTER (OUTPATIENT)
Age: 59
Discharge: HOME | End: 2024-12-12
Payer: COMMERCIAL

## 2024-12-12 LAB
ALBUMIN PERCENT: NORMAL %
ALBUMIN SERPL-MCNC: NORMAL G/DL
ALPHA 2 PERCENT: NORMAL %
ALPHA1 GLOB SERPL ELPH-MCNC: NORMAL %
ALPHA1 GLOB SERPL ELPH-MCNC: NORMAL G/DL
ALPHA2 GLOB SERPL ELPH-MCNC: NORMAL G/DL
B-GLOBULIN SERPL ELPH-MCNC: NORMAL %
B-GLOBULIN SERPL ELPH-MCNC: NORMAL G/DL
BASOPHILS # BLD: 0.05 K/UL
BASOPHILS NFR BLD: 1 % (ref 0–1)
BILIRUB UR QL STRIP: NEGATIVE
BUN SERPL-MCNC: 16 MG/DL (ref 7–20)
C3 SERPL-MCNC: 144 MG/DL (ref 90–180)
C4 SERPL-MCNC: 32 MG/DL (ref 10–40)
CK SERPL-CCNC: 187 U/L (ref 26–192)
CLARITY UR: CLEAR
COLOR UR: YELLOW
COMMENT: NORMAL
CREAT SERPL-MCNC: 1 MG/DL (ref 0.9–1.3)
CRP SERPL HS-MCNC: <3 MG/L (ref 0–5)
EOSINOPHIL # BLD: 0.54 K/UL
EOSINOPHILS RELATIVE PERCENT: 7 % (ref 0–3)
EPI CELLS #/AREA URNS HPF: <1 /HPF
ERYTHROCYTE [DISTWIDTH] IN BLOOD BY AUTOMATED COUNT: 13.6 % (ref 11.7–14.9)
GAMMA GLOB SERPL ELPH-MCNC: NORMAL G/DL
GAMMA GLOBULIN %: NORMAL %
GFR, ESTIMATED: 77 ML/MIN/1.73M2
GLUCOSE UR STRIP-MCNC: NEGATIVE MG/DL
HBV CORE AB SER QL: NONREACTIVE
HBV SURFACE AB SERPL IA-ACNC: 3.5 MIU/ML
HBV SURFACE AG SERPL QL IA: NONREACTIVE
HCT VFR BLD AUTO: 37.3 % (ref 42–52)
HCV AB SERPL QL IA: NONREACTIVE
HGB BLD-MCNC: 12.6 G/DL (ref 13.5–18)
HGB UR QL STRIP.AUTO: NEGATIVE
IMM GRANULOCYTES # BLD AUTO: 0.02 K/UL
IMM GRANULOCYTES NFR BLD: 0 %
KETONES UR STRIP-MCNC: NEGATIVE MG/DL
LEUKOCYTE ESTERASE UR QL STRIP: NEGATIVE
LYMPHOCYTES NFR BLD: 2.91 K/UL
LYMPHOCYTES RELATIVE PERCENT: 35 % (ref 24–44)
M PROTEIN 2 SERPL ELPH-MCNC: NORMAL G/DL
M PROTEIN SERPL ELPH-MCNC: NORMAL G/DL
MCH RBC QN AUTO: 31 PG (ref 27–31)
MCHC RBC AUTO-ENTMCNC: 33.8 G/DL (ref 32–36)
MCV RBC AUTO: 91.9 FL (ref 78–100)
MONOCYTES NFR BLD: 0.54 K/UL
MONOCYTES NFR BLD: 7 % (ref 0–4)
MUCOUS THREADS URNS QL MICRO: ABNORMAL
NEUTROPHILS NFR BLD: 51 % (ref 36–66)
NEUTS SEG NFR BLD: 4.24 K/UL
NITRITE UR QL STRIP: NEGATIVE
PATHOLOGIST: NORMAL
PH UR STRIP: 6 [PH] (ref 5–8)
PLATELET # BLD AUTO: 255 K/UL (ref 140–440)
PMV BLD AUTO: 9.5 FL (ref 7.5–11.1)
PROT PATTERN SERPL ELPH-IMP: NORMAL
PROT SERPL-MCNC: 6.2 G/DL
PROT UR STRIP-MCNC: NEGATIVE MG/DL
RBC # BLD AUTO: 4.06 M/UL (ref 4.6–6.2)
RBC #/AREA URNS HPF: 1 /HPF (ref 0–2)
SP GR UR STRIP: 1.02 (ref 1–1.03)
TOTAL PROT. SUM,%: NORMAL %
TOTAL PROT. SUM: NORMAL G/DL
TRICHOMONAS #/AREA URNS HPF: ABNORMAL /[HPF]
UROBILINOGEN UR STRIP-ACNC: 0.2 EU/DL (ref 0–1)
WBC #/AREA URNS HPF: <1 /HPF (ref 0–5)
WBC OTHER # BLD: 8.3 K/UL (ref 4–10.5)

## 2024-12-12 PROCEDURE — 82550 ASSAY OF CK (CPK): CPT

## 2024-12-12 PROCEDURE — 86162 COMPLEMENT TOTAL (CH50): CPT

## 2024-12-12 PROCEDURE — 86707 HEPATITIS BE ANTIBODY: CPT

## 2024-12-12 PROCEDURE — 85025 COMPLETE CBC W/AUTO DIFF WBC: CPT

## 2024-12-12 PROCEDURE — 86235 NUCLEAR ANTIGEN ANTIBODY: CPT

## 2024-12-12 PROCEDURE — 86225 DNA ANTIBODY NATIVE: CPT

## 2024-12-12 PROCEDURE — 86481 TB AG RESPONSE T-CELL SUSP: CPT

## 2024-12-12 PROCEDURE — 86803 HEPATITIS C AB TEST: CPT

## 2024-12-12 PROCEDURE — 82565 ASSAY OF CREATININE: CPT

## 2024-12-12 PROCEDURE — 86160 COMPLEMENT ANTIGEN: CPT

## 2024-12-12 PROCEDURE — 87350 HEPATITIS BE AG IA: CPT

## 2024-12-12 PROCEDURE — 84155 ASSAY OF PROTEIN SERUM: CPT

## 2024-12-12 PROCEDURE — 86200 CCP ANTIBODY: CPT

## 2024-12-12 PROCEDURE — 83516 IMMUNOASSAY NONANTIBODY: CPT

## 2024-12-12 PROCEDURE — 86704 HEP B CORE ANTIBODY TOTAL: CPT

## 2024-12-12 PROCEDURE — 87040 BLOOD CULTURE FOR BACTERIA: CPT

## 2024-12-12 PROCEDURE — 86140 C-REACTIVE PROTEIN: CPT

## 2024-12-12 PROCEDURE — 36415 COLL VENOUS BLD VENIPUNCTURE: CPT

## 2024-12-12 PROCEDURE — 84520 ASSAY OF UREA NITROGEN: CPT

## 2024-12-12 PROCEDURE — 86812 HLA TYPING A B OR C: CPT

## 2024-12-12 PROCEDURE — 81001 URINALYSIS AUTO W/SCOPE: CPT

## 2024-12-12 PROCEDURE — 87340 HEPATITIS B SURFACE AG IA: CPT

## 2024-12-12 PROCEDURE — 86317 IMMUNOASSAY INFECTIOUS AGENT: CPT

## 2024-12-12 PROCEDURE — 84165 PROTEIN E-PHORESIS SERUM: CPT

## 2024-12-13 LAB
COMPLEMENT TOTAL (CH50): 88.1 U/ML (ref 38.7–89.9)
HBV E AB SERPL QL IA: NEGATIVE
HBV E AG SERPL QL IA: NEGATIVE
MICROORGANISM SPEC CULT: NORMAL
SERVICE CMNT-IMP: NORMAL
SPECIMEN DESCRIPTION: NORMAL

## 2024-12-14 LAB — CYCLIC CITRULLIN PEPTIDE AB: 4 UNITS (ref 0–19)

## 2024-12-15 LAB
DEPRECATED S PNEUM 1 IGG SER-MCNC: 1 AU/ML (ref 0–19)
HLA B27: NEGATIVE
SERINE PROTEASE 3, IGG: 0 AU/ML (ref 0–19)

## 2024-12-16 LAB — T-SPOT TB TEST: NEGATIVE

## 2024-12-17 LAB
MICROORGANISM SPEC CULT: NORMAL
SERVICE CMNT-IMP: NORMAL
SPECIMEN DESCRIPTION: NORMAL

## 2024-12-21 LAB
MISCELLANEOUS LAB TEST RESULT: NORMAL
TEST NAME: NORMAL

## 2025-01-02 LAB
MICROORGANISM SPEC CULT: NORMAL
SERVICE CMNT-IMP: NORMAL
SPECIMEN DESCRIPTION: NORMAL

## 2025-02-17 RX ORDER — ATORVASTATIN CALCIUM 80 MG/1
80 TABLET, FILM COATED ORAL NIGHTLY
Qty: 90 TABLET | Refills: 1 | Status: SHIPPED | OUTPATIENT
Start: 2025-02-17

## 2025-02-17 RX ORDER — ATORVASTATIN CALCIUM 80 MG/1
80 TABLET, FILM COATED ORAL NIGHTLY
Qty: 30 TABLET | Refills: 1 | OUTPATIENT
Start: 2025-02-17

## 2025-02-24 ENCOUNTER — TELEPHONE (OUTPATIENT)
Dept: CARDIOLOGY CLINIC | Age: 60
End: 2025-02-24

## 2025-02-24 RX ORDER — METOPROLOL TARTRATE 100 MG/1
100 TABLET ORAL 2 TIMES DAILY
Qty: 180 TABLET | Refills: 1 | OUTPATIENT
Start: 2025-02-24

## 2025-02-24 RX ORDER — METOPROLOL TARTRATE 100 MG/1
100 TABLET ORAL 2 TIMES DAILY
Qty: 180 TABLET | Refills: 3 | Status: SHIPPED | OUTPATIENT
Start: 2025-02-24

## 2025-02-25 ENCOUNTER — HOSPITAL ENCOUNTER (OUTPATIENT)
Age: 60
Discharge: HOME | End: 2025-02-25
Payer: COMMERCIAL

## 2025-02-25 LAB
ALT SERPL-CCNC: 21 U/L (ref 10–40)
AST SERPL-CCNC: 18 U/L (ref 15–37)
BUN SERPL-MCNC: 19 MG/DL (ref 7–20)
CREAT SERPL-MCNC: 1 MG/DL (ref 0.9–1.3)
ERYTHROCYTE [DISTWIDTH] IN BLOOD BY AUTOMATED COUNT: 14.4 % (ref 11.7–14.9)
ERYTHROCYTE [SEDIMENTATION RATE] IN BLOOD BY WESTERGREN METHOD: 18 MM/HR (ref 0–20)
GFR, ESTIMATED: 76 ML/MIN/1.73M2
HCT VFR BLD AUTO: 41 % (ref 42–52)
HGB BLD-MCNC: 13.3 G/DL (ref 13.5–18)
MCH RBC QN AUTO: 30.9 PG (ref 27–31)
MCHC RBC AUTO-ENTMCNC: 32.4 G/DL (ref 32–36)
MCV RBC AUTO: 95.3 FL (ref 78–100)
PLATELET # BLD AUTO: 321 K/UL (ref 140–440)
PMV BLD AUTO: 9.5 FL (ref 7.5–11.1)
RBC # BLD AUTO: 4.3 M/UL (ref 4.6–6.2)
WBC OTHER # BLD: 10.2 K/UL (ref 4–10.5)

## 2025-02-25 PROCEDURE — 85027 COMPLETE CBC AUTOMATED: CPT

## 2025-02-25 PROCEDURE — 82565 ASSAY OF CREATININE: CPT

## 2025-02-25 PROCEDURE — 85652 RBC SED RATE AUTOMATED: CPT

## 2025-02-25 PROCEDURE — 84460 ALANINE AMINO (ALT) (SGPT): CPT

## 2025-02-25 PROCEDURE — 84450 TRANSFERASE (AST) (SGOT): CPT

## 2025-02-25 PROCEDURE — 84520 ASSAY OF UREA NITROGEN: CPT

## 2025-03-06 ENCOUNTER — OFFICE VISIT (OUTPATIENT)
Dept: CARDIOLOGY CLINIC | Age: 60
End: 2025-03-06
Payer: COMMERCIAL

## 2025-03-06 VITALS
SYSTOLIC BLOOD PRESSURE: 116 MMHG | WEIGHT: 267 LBS | HEART RATE: 64 BPM | DIASTOLIC BLOOD PRESSURE: 78 MMHG | HEIGHT: 74 IN | BODY MASS INDEX: 34.27 KG/M2

## 2025-03-06 DIAGNOSIS — I25.2 H/O NON-ST ELEVATION MYOCARDIAL INFARCTION (NSTEMI): ICD-10-CM

## 2025-03-06 DIAGNOSIS — I25.10 ASCVD (ARTERIOSCLEROTIC CARDIOVASCULAR DISEASE): Primary | ICD-10-CM

## 2025-03-06 DIAGNOSIS — I10 PRIMARY HYPERTENSION: ICD-10-CM

## 2025-03-06 DIAGNOSIS — Z72.0 TOBACCO ABUSE: ICD-10-CM

## 2025-03-06 DIAGNOSIS — E78.2 MIXED HYPERLIPIDEMIA: ICD-10-CM

## 2025-03-06 DIAGNOSIS — I49.3 FREQUENT PVCS: ICD-10-CM

## 2025-03-06 PROCEDURE — 3078F DIAST BP <80 MM HG: CPT | Performed by: NURSE PRACTITIONER

## 2025-03-06 PROCEDURE — 3074F SYST BP LT 130 MM HG: CPT | Performed by: NURSE PRACTITIONER

## 2025-03-06 PROCEDURE — 99214 OFFICE O/P EST MOD 30 MIN: CPT | Performed by: NURSE PRACTITIONER

## 2025-03-06 RX ORDER — METHOTREXATE 2.5 MG/1
2.5 TABLET ORAL WEEKLY
COMMUNITY
Start: 2025-02-14

## 2025-03-06 NOTE — PATIENT INSTRUCTIONS
Thank you for allowing us to care for you today!   We want to ensure we can follow your treatment plan and we strive to give you the best outcomes and experience possible.   If you ever have a life threatening emergency and call 911 - for an ambulance (EMS)  REMEMBER  Our providers can only care for you at:   [facility] or [facility]   Even if you have someone take you or you drive yourself we can only care for you in a [facility] facility. Our providers are not setup at the other healthcare locations!    PLEASE CALL OUR OFFICE DURING NORMAL BUSINESS HOURS  Monday through Friday 8 am to 5 pm  AFTER HOURS the physician on-call cannot help with scheduling, rescheduling, procedure instruction questions or any type of medication need or issue.  [facility] P:[phone] - [facility] P:[phone] - [facility] P:[phone]      If you receive a survey:  We would appreciate you taking the time to share your experience concerning your provider visit in the office.    These surveys are confidential!  We are eager to improve and are counting on you to share your feedback so we can ensure you get the best care possible.

## 2025-03-06 NOTE — PROGRESS NOTES
CLINICAL STAFF DOCUMENTATION    Lexus Cohen, YOSI     Prasad Buchanan  1965  [id number]    Have you had any Chest Pain recently? - No        Have you had any Shortness of Breath - No  Have you had any dizziness - No  Have you had any palpitations recently? - No  Do you have any edema - swelling in No             When did you have your last labs drawn 02/25   What doctor ordered ranginwala  Do we have the labs in their chart Yes      If we do not have these labs, you are retrieve these labs for the provider!    Do you have a surgery or procedure scheduled in the near future - No  Do use tobacco products? - Yes half pack   Do you drink alcohol? - No  Do you use any illicit drugs? - No  Caffeine? - Yes  How much caffeine? .6  cups coffee  ice teas 1 a day      Check medication list thoroughly!!! AND RECONCILE OUTSIDE MEDICATIONS  If dose has changed change the entire order not just the MG  BE SURE TO ASK PATIENT IF THEY NEED MEDICATION REFILLS  Verify Pharmacy and update if incorrect    Add to every patient's \"wrap up\" the following dot phrase AFTERVISITCARDIOHEARTHOUSE and ensure we explain this to our patients   
 Heart sounds: Normal heart sounds. No murmur heard.  Pulmonary:      Effort: Pulmonary effort is normal. No respiratory distress.      Breath sounds: Normal breath sounds.   Musculoskeletal:         General: No swelling or deformity.      Cervical back: Neck supple. No muscular tenderness.   Neurological:      Mental Status: He is alert.         Results  Laboratory Studies  RA test for rheumatoid arthritis is negative.      ASSESSMENT/PLAN:  ASCVD (arteriosclerotic cardiovascular disease)  S/p PCI to RCA for NSTEMI in Feb 2019.   continue aspirin 81 mg, continue statins.  . He stopped imdur due to \"feeling funny\"he is not exercising anymore , stress test in Dec 2019 showed no ischemia.  Unfortunately not very compliant with diet and smoking again  Did stress test 1/1/2022 Completed 12 METS but did  not reach target   NM stress test 2/2023 was normal.   Continue Metoprolol and ASA and Lipitor      Fatigue  He really thinks his low energy is due to low testosterone level. He stopped testosterone due to elevated PSA.      Bradycardia  Significant PVC burden 15% on 24 hour monitor  TSH mag and lytes are normal.   Reviewed tobacco and ETOH increases stimulation for arrhythmia  Continue metoprolol.   Mag caused severe constipation.      Tobacco abuse  He is smoking again.  He was encouraged not to smoke anymore     Dyslipidemia   All available lab work was reviewed. Patient was advised to repeat lab work before next visit  He has high triglyceride levels and very high LDL. He has been intolerant of statins in the past  Continue lipitor 80 mg Daily      Return in about 6 months (around 9/6/2025).    The patient (or guardian, if applicable) and other individuals in attendance with the patient were advised that Artificial Intelligence will be utilized during this visit to record, process the conversation to generate a clinical note, and support improvement of the AI technology. The patient (or guardian, if applicable) and

## 2025-03-07 ASSESSMENT — ENCOUNTER SYMPTOMS
COUGH: 0
SHORTNESS OF BREATH: 0

## 2025-04-07 RX ORDER — OMEPRAZOLE 20 MG/1
20 CAPSULE, DELAYED RELEASE ORAL 2 TIMES DAILY
Qty: 180 CAPSULE | Refills: 1 | OUTPATIENT
Start: 2025-04-07

## 2025-04-08 RX ORDER — OMEPRAZOLE 20 MG/1
20 CAPSULE, DELAYED RELEASE ORAL DAILY
Qty: 30 CAPSULE | Refills: 5 | Status: SHIPPED | OUTPATIENT
Start: 2025-04-08

## 2025-05-20 ENCOUNTER — HOSPITAL ENCOUNTER (OUTPATIENT)
Age: 60
Discharge: HOME | End: 2025-05-20
Payer: COMMERCIAL

## 2025-05-20 LAB
ALT SERPL-CCNC: 21 U/L (ref 10–40)
AST SERPL-CCNC: 21 U/L (ref 15–37)
BASOPHILS # BLD: 0.08 K/UL
BASOPHILS NFR BLD: 1 % (ref 0–1)
BUN SERPL-MCNC: 12 MG/DL (ref 7–20)
C3 SERPL-MCNC: 125 MG/DL (ref 90–180)
C4 SERPL-MCNC: 27 MG/DL (ref 10–40)
CREAT SERPL-MCNC: 1 MG/DL (ref 0.9–1.3)
EOSINOPHIL # BLD: 0.65 K/UL
EOSINOPHILS RELATIVE PERCENT: 7 % (ref 0–3)
ERYTHROCYTE [DISTWIDTH] IN BLOOD BY AUTOMATED COUNT: 14.1 % (ref 11.7–14.9)
ERYTHROCYTE [SEDIMENTATION RATE] IN BLOOD BY WESTERGREN METHOD: 9 MM/HR (ref 0–20)
GFR, ESTIMATED: 77 ML/MIN/1.73M2
HCT VFR BLD AUTO: 38.9 % (ref 42–52)
HGB BLD-MCNC: 13 G/DL (ref 13.5–18)
IMM GRANULOCYTES # BLD AUTO: 0.03 K/UL
IMM GRANULOCYTES NFR BLD: 0 %
LYMPHOCYTES NFR BLD: 3.35 K/UL
LYMPHOCYTES RELATIVE PERCENT: 34 % (ref 24–44)
MCH RBC QN AUTO: 32.4 PG (ref 27–31)
MCHC RBC AUTO-ENTMCNC: 33.4 G/DL (ref 32–36)
MCV RBC AUTO: 97 FL (ref 78–100)
MONOCYTES NFR BLD: 0.7 K/UL
MONOCYTES NFR BLD: 7 % (ref 0–5)
NEUTROPHILS NFR BLD: 51 % (ref 36–66)
NEUTS SEG NFR BLD: 5.05 K/UL
PLATELET # BLD AUTO: 319 K/UL (ref 140–440)
PMV BLD AUTO: 10.3 FL (ref 7.5–11.1)
RBC # BLD AUTO: 4.01 M/UL (ref 4.6–6.2)
WBC OTHER # BLD: 9.9 K/UL (ref 4–10.5)

## 2025-05-20 PROCEDURE — 84450 TRANSFERASE (AST) (SGOT): CPT

## 2025-05-20 PROCEDURE — 84520 ASSAY OF UREA NITROGEN: CPT

## 2025-05-20 PROCEDURE — 36415 COLL VENOUS BLD VENIPUNCTURE: CPT

## 2025-05-20 PROCEDURE — 85652 RBC SED RATE AUTOMATED: CPT

## 2025-05-20 PROCEDURE — 84460 ALANINE AMINO (ALT) (SGPT): CPT

## 2025-05-20 PROCEDURE — 85025 COMPLETE CBC W/AUTO DIFF WBC: CPT

## 2025-05-20 PROCEDURE — 86160 COMPLEMENT ANTIGEN: CPT

## 2025-05-20 PROCEDURE — 82565 ASSAY OF CREATININE: CPT

## 2025-05-20 PROCEDURE — 86225 DNA ANTIBODY NATIVE: CPT

## 2025-05-22 LAB — DNA ANTIBODY: 1 IU/ML (ref 0–9)

## 2025-08-06 RX ORDER — ATORVASTATIN CALCIUM 80 MG/1
80 TABLET, FILM COATED ORAL NIGHTLY
Qty: 90 TABLET | Refills: 1 | Status: SHIPPED | OUTPATIENT
Start: 2025-08-06

## 2025-09-02 ENCOUNTER — HOSPITAL ENCOUNTER (OUTPATIENT)
Dept: LAB | Age: 60
Discharge: HOME | End: 2025-09-02
Payer: COMMERCIAL

## 2025-09-02 LAB
ALT SERPL-CCNC: 26 U/L (ref 10–40)
AST SERPL-CCNC: 24 U/L (ref 15–37)
BUN SERPL-MCNC: 15 MG/DL (ref 7–20)
CREAT SERPL-MCNC: 1 MG/DL (ref 0.9–1.3)
ERYTHROCYTE [DISTWIDTH] IN BLOOD BY AUTOMATED COUNT: 13.9 % (ref 11.7–14.9)
ERYTHROCYTE [SEDIMENTATION RATE] IN BLOOD BY WESTERGREN METHOD: 12 MM/HR (ref 0–20)
GFR, ESTIMATED: 73 ML/MIN/1.73M2
HCT VFR BLD AUTO: 38.3 % (ref 42–52)
HGB BLD-MCNC: 12.8 G/DL (ref 13.5–18)
MCH RBC QN AUTO: 31.4 PG (ref 27–31)
MCHC RBC AUTO-ENTMCNC: 33.4 G/DL (ref 32–36)
MCV RBC AUTO: 94.1 FL (ref 78–100)
PLATELET # BLD AUTO: 286 K/UL (ref 140–440)
PMV BLD AUTO: 9.9 FL (ref 7.5–11.1)
RBC # BLD AUTO: 4.07 M/UL (ref 4.6–6.2)
WBC OTHER # BLD: 9.8 K/UL (ref 4–10.5)

## 2025-09-02 PROCEDURE — 84450 TRANSFERASE (AST) (SGOT): CPT

## 2025-09-02 PROCEDURE — 85652 RBC SED RATE AUTOMATED: CPT

## 2025-09-02 PROCEDURE — 82565 ASSAY OF CREATININE: CPT

## 2025-09-02 PROCEDURE — 84460 ALANINE AMINO (ALT) (SGPT): CPT

## 2025-09-02 PROCEDURE — 84520 ASSAY OF UREA NITROGEN: CPT

## 2025-09-02 PROCEDURE — 85027 COMPLETE CBC AUTOMATED: CPT

## (undated) DEVICE — LINER SUCT CANSTR 1500CC SEMI RIG W/ POR HYDROPHOBIC SHUT

## (undated) DEVICE — ENDOSCOPY KIT: Brand: MEDLINE INDUSTRIES, INC.

## (undated) DEVICE — BW-412T DISP COMBO CLEANING BRUSH: Brand: SINGLE USE COMBINATION CLEANING BRUSH

## (undated) DEVICE — KENDALL 500 SERIES DIAPHORETIC FOAM MONITORING ELECTRODE - TEAR DROP SHAPE ( 30/PK): Brand: KENDALL

## (undated) DEVICE — LINE SAMP O2 6.5FT W/FEMALE CONN F/ADULT CAPNOLINE PLUS

## (undated) DEVICE — TUBING, SUCTION, 3/16" X 6', STRAIGHT: Brand: MEDLINE

## (undated) DEVICE — FORCEPS BX L240CM JAW DIA2.8MM L CAP W/ NDL MIC MESH TOOTH

## (undated) DEVICE — JELLY LUBRICATING 3 GM BACTERIOSTATIC

## (undated) DEVICE — SNARE VASC L240CM LOOP W10MM SHTH DIA2.4MM RND STIFF CLD

## (undated) DEVICE — THE TORRENT IRRIGATION SCOPE CONNECTOR IS USED WITH THE TORRENT IRRIGATION TUBING TO PROVIDE IRRIGATION FLUIDS SUCH AS STERILE WATER DURING GASTROINTESTINAL ENDOSCOPIC PROCEDURES WHEN USED IN CONJUNCTION WITH AN IRRIGATION PUMP (OR ELECTROSURGICAL UNIT).: Brand: TORRENT

## (undated) DEVICE — SNARE ENDOSCP CLD 230 CM 10 MM 2.3 MM ROTATABLE LESIONHUNTER